# Patient Record
Sex: MALE | Race: WHITE | NOT HISPANIC OR LATINO | Employment: UNEMPLOYED | URBAN - NONMETROPOLITAN AREA
[De-identification: names, ages, dates, MRNs, and addresses within clinical notes are randomized per-mention and may not be internally consistent; named-entity substitution may affect disease eponyms.]

---

## 2020-05-31 ENCOUNTER — HOSPITAL ENCOUNTER (INPATIENT)
Facility: HOSPITAL | Age: 35
LOS: 3 days | Discharge: HOME | End: 2020-06-04
Attending: STUDENT IN AN ORGANIZED HEALTH CARE EDUCATION/TRAINING PROGRAM | Admitting: INTERNAL MEDICINE

## 2020-05-31 ENCOUNTER — APPOINTMENT (OUTPATIENT)
Dept: CT IMAGING | Facility: HOSPITAL | Age: 35
End: 2020-05-31

## 2020-05-31 DIAGNOSIS — R10.11 RIGHT UPPER QUADRANT ABDOMINAL PAIN: ICD-10-CM

## 2020-05-31 DIAGNOSIS — F19.90 IV DRUG USER: ICD-10-CM

## 2020-05-31 DIAGNOSIS — A41.9 SEPSIS, DUE TO UNSPECIFIED ORGANISM, UNSPECIFIED WHETHER ACUTE ORGAN DYSFUNCTION PRESENT (HCC): Primary | ICD-10-CM

## 2020-05-31 LAB
ALBUMIN SERPL-MCNC: 3.9 G/DL (ref 3.5–5.2)
ALBUMIN/GLOB SERPL: 2 G/DL
ALP SERPL-CCNC: 100 U/L (ref 39–117)
ALT SERPL W P-5'-P-CCNC: 208 U/L (ref 1–41)
AMPHET+METHAMPHET UR QL: NEGATIVE
AMPHETAMINES UR QL: NEGATIVE
ANION GAP SERPL CALCULATED.3IONS-SCNC: 16.6 MMOL/L (ref 5–15)
APTT PPP: 45.4 SECONDS (ref 24.5–37.2)
AST SERPL-CCNC: 119 U/L (ref 1–40)
BACTERIA UR QL AUTO: ABNORMAL /HPF
BARBITURATES UR QL SCN: NEGATIVE
BENZODIAZ UR QL SCN: NEGATIVE
BILIRUB SERPL-MCNC: 2.5 MG/DL (ref 0.2–1.2)
BILIRUB UR QL STRIP: ABNORMAL
BUN BLD-MCNC: 36 MG/DL (ref 6–20)
BUN/CREAT SERPL: 16.4 (ref 7–25)
BUPRENORPHINE SERPL-MCNC: NEGATIVE NG/ML
CALCIUM SPEC-SCNC: 8.4 MG/DL (ref 8.6–10.5)
CANNABINOIDS SERPL QL: NEGATIVE
CHLORIDE SERPL-SCNC: 99 MMOL/L (ref 98–107)
CLARITY UR: ABNORMAL
CO2 SERPL-SCNC: 22.4 MMOL/L (ref 22–29)
COCAINE UR QL: NEGATIVE
COD CRY URNS QL: ABNORMAL /HPF
COLOR UR: ABNORMAL
CREAT BLD-MCNC: 2.19 MG/DL (ref 0.76–1.27)
D-LACTATE SERPL-SCNC: 1.8 MMOL/L (ref 0.5–2)
DEPRECATED RDW RBC AUTO: 42.5 FL (ref 37–54)
ERYTHROCYTE [DISTWIDTH] IN BLOOD BY AUTOMATED COUNT: 13.4 % (ref 12.3–15.4)
GFR SERPL CREATININE-BSD FRML MDRD: 34 ML/MIN/1.73
GLOBULIN UR ELPH-MCNC: 2 GM/DL
GLUCOSE BLD-MCNC: 116 MG/DL (ref 65–99)
GLUCOSE UR STRIP-MCNC: NEGATIVE MG/DL
GRAN CASTS URNS QL MICRO: ABNORMAL /LPF
HCT VFR BLD AUTO: 42.6 % (ref 37.5–51)
HGB BLD-MCNC: 14.8 G/DL (ref 13–17.7)
HGB UR QL STRIP.AUTO: NEGATIVE
HYALINE CASTS UR QL AUTO: ABNORMAL /LPF
INR PPP: 1.35 (ref 0.9–1.1)
KETONES UR QL STRIP: NEGATIVE
LEUKOCYTE ESTERASE UR QL STRIP.AUTO: ABNORMAL
LIPASE SERPL-CCNC: 24 U/L (ref 13–60)
LYMPHOCYTES # BLD MANUAL: 0.87 10*3/MM3 (ref 0.7–3.1)
LYMPHOCYTES NFR BLD MANUAL: 1 % (ref 5–12)
LYMPHOCYTES NFR BLD MANUAL: 3 % (ref 19.6–45.3)
MCH RBC QN AUTO: 30.1 PG (ref 26.6–33)
MCHC RBC AUTO-ENTMCNC: 34.7 G/DL (ref 31.5–35.7)
MCV RBC AUTO: 86.6 FL (ref 79–97)
METAMYELOCYTES NFR BLD MANUAL: 2 % (ref 0–0)
METHADONE UR QL SCN: NEGATIVE
MONOCYTES # BLD AUTO: 0.29 10*3/MM3 (ref 0.1–0.9)
NEUTROPHILS # BLD AUTO: 27.21 10*3/MM3 (ref 1.7–7)
NEUTROPHILS NFR BLD MANUAL: 67 % (ref 42.7–76)
NEUTS BAND NFR BLD MANUAL: 27 % (ref 0–5)
NITRITE UR QL STRIP: NEGATIVE
OPIATES UR QL: NEGATIVE
OXYCODONE UR QL SCN: NEGATIVE
PCP UR QL SCN: NEGATIVE
PH UR STRIP.AUTO: <=5 [PH] (ref 5–8)
PLATELET # BLD AUTO: 145 10*3/MM3 (ref 140–450)
PMV BLD AUTO: 10.9 FL (ref 6–12)
POTASSIUM BLD-SCNC: 4.3 MMOL/L (ref 3.5–5.2)
PROCALCITONIN SERPL-MCNC: 72.26 NG/ML (ref 0.1–0.25)
PROPOXYPH UR QL: NEGATIVE
PROT SERPL-MCNC: 5.9 G/DL (ref 6–8.5)
PROT UR QL STRIP: ABNORMAL
PROTHROMBIN TIME: 17.3 SECONDS (ref 12–15.1)
RBC # BLD AUTO: 4.92 10*6/MM3 (ref 4.14–5.8)
RBC # UR: ABNORMAL /HPF
RBC MORPH BLD: NORMAL
REF LAB TEST METHOD: ABNORMAL
SCAN SLIDE: NORMAL
SMALL PLATELETS BLD QL SMEAR: ADEQUATE
SODIUM BLD-SCNC: 138 MMOL/L (ref 136–145)
SP GR UR STRIP: 1.02 (ref 1–1.03)
SQUAMOUS #/AREA URNS HPF: ABNORMAL /HPF
TRICYCLICS UR QL SCN: NEGATIVE
UROBILINOGEN UR QL STRIP: ABNORMAL
WBC MORPH BLD: NORMAL
WBC NRBC COR # BLD: 28.95 10*3/MM3 (ref 3.4–10.8)
WBC UR QL AUTO: ABNORMAL /HPF

## 2020-05-31 PROCEDURE — 25010000002 CEFTRIAXONE SODIUM-DEXTROSE 1-3.74 GM-%(50ML) RECONSTITUTED SOLUTION: Performed by: NURSE PRACTITIONER

## 2020-05-31 PROCEDURE — 84145 PROCALCITONIN (PCT): CPT | Performed by: NURSE PRACTITIONER

## 2020-05-31 PROCEDURE — 80306 DRUG TEST PRSMV INSTRMNT: CPT | Performed by: NURSE PRACTITIONER

## 2020-05-31 PROCEDURE — 81001 URINALYSIS AUTO W/SCOPE: CPT | Performed by: NURSE PRACTITIONER

## 2020-05-31 PROCEDURE — 85007 BL SMEAR W/DIFF WBC COUNT: CPT | Performed by: NURSE PRACTITIONER

## 2020-05-31 PROCEDURE — 85025 COMPLETE CBC W/AUTO DIFF WBC: CPT | Performed by: NURSE PRACTITIONER

## 2020-05-31 PROCEDURE — 25010000002 KETOROLAC TROMETHAMINE PER 15 MG: Performed by: NURSE PRACTITIONER

## 2020-05-31 PROCEDURE — 83605 ASSAY OF LACTIC ACID: CPT | Performed by: NURSE PRACTITIONER

## 2020-05-31 PROCEDURE — 74177 CT ABD & PELVIS W/CONTRAST: CPT

## 2020-05-31 PROCEDURE — 87040 BLOOD CULTURE FOR BACTERIA: CPT | Performed by: NURSE PRACTITIONER

## 2020-05-31 PROCEDURE — 83690 ASSAY OF LIPASE: CPT | Performed by: NURSE PRACTITIONER

## 2020-05-31 PROCEDURE — 99284 EMERGENCY DEPT VISIT MOD MDM: CPT

## 2020-05-31 PROCEDURE — 71250 CT THORAX DX C-: CPT

## 2020-05-31 PROCEDURE — 85730 THROMBOPLASTIN TIME PARTIAL: CPT | Performed by: NURSE PRACTITIONER

## 2020-05-31 PROCEDURE — 25010000002 IOPAMIDOL 61 % SOLUTION: Performed by: STUDENT IN AN ORGANIZED HEALTH CARE EDUCATION/TRAINING PROGRAM

## 2020-05-31 PROCEDURE — 85610 PROTHROMBIN TIME: CPT | Performed by: NURSE PRACTITIONER

## 2020-05-31 PROCEDURE — 25010000002 ONDANSETRON PER 1 MG: Performed by: NURSE PRACTITIONER

## 2020-05-31 PROCEDURE — 80053 COMPREHEN METABOLIC PANEL: CPT | Performed by: NURSE PRACTITIONER

## 2020-05-31 RX ORDER — CEFTRIAXONE 1 G/50ML
1 INJECTION, SOLUTION INTRAVENOUS ONCE
Status: COMPLETED | OUTPATIENT
Start: 2020-05-31 | End: 2020-05-31

## 2020-05-31 RX ORDER — NICOTINE 21 MG/24HR
1 PATCH, TRANSDERMAL 24 HOURS TRANSDERMAL
Status: DISCONTINUED | OUTPATIENT
Start: 2020-05-31 | End: 2020-06-04 | Stop reason: HOSPADM

## 2020-05-31 RX ORDER — SODIUM CHLORIDE 0.9 % (FLUSH) 0.9 %
10 SYRINGE (ML) INJECTION AS NEEDED
Status: DISCONTINUED | OUTPATIENT
Start: 2020-05-31 | End: 2020-06-04 | Stop reason: HOSPADM

## 2020-05-31 RX ORDER — NICOTINE 21 MG/24HR
1 PATCH, TRANSDERMAL 24 HOURS TRANSDERMAL
Status: DISCONTINUED | OUTPATIENT
Start: 2020-06-01 | End: 2020-05-31

## 2020-05-31 RX ORDER — KETOROLAC TROMETHAMINE 30 MG/ML
30 INJECTION, SOLUTION INTRAMUSCULAR; INTRAVENOUS ONCE
Status: COMPLETED | OUTPATIENT
Start: 2020-05-31 | End: 2020-05-31

## 2020-05-31 RX ORDER — ONDANSETRON 2 MG/ML
4 INJECTION INTRAMUSCULAR; INTRAVENOUS ONCE
Status: COMPLETED | OUTPATIENT
Start: 2020-05-31 | End: 2020-05-31

## 2020-05-31 RX ADMIN — ONDANSETRON 4 MG: 2 INJECTION INTRAMUSCULAR; INTRAVENOUS at 21:08

## 2020-05-31 RX ADMIN — KETOROLAC TROMETHAMINE 30 MG: 30 INJECTION, SOLUTION INTRAMUSCULAR at 21:09

## 2020-05-31 RX ADMIN — CEFTRIAXONE 1 G: 1 INJECTION, SOLUTION INTRAVENOUS at 22:27

## 2020-05-31 RX ADMIN — NICOTINE 1 PATCH: 21 PATCH TRANSDERMAL at 23:02

## 2020-05-31 RX ADMIN — SODIUM CHLORIDE 2391 ML: 9 INJECTION, SOLUTION INTRAVENOUS at 21:08

## 2020-05-31 RX ADMIN — SODIUM CHLORIDE 1000 ML: 9 INJECTION, SOLUTION INTRAVENOUS at 23:02

## 2020-05-31 RX ADMIN — IOPAMIDOL 100 ML: 612 INJECTION, SOLUTION INTRAVENOUS at 21:33

## 2020-06-01 ENCOUNTER — APPOINTMENT (OUTPATIENT)
Dept: ULTRASOUND IMAGING | Facility: HOSPITAL | Age: 35
End: 2020-06-01

## 2020-06-01 PROBLEM — B18.2 CHRONIC HEPATITIS C WITHOUT HEPATIC COMA (HCC): Status: ACTIVE | Noted: 2020-06-01

## 2020-06-01 PROBLEM — R10.11 RIGHT UPPER QUADRANT ABDOMINAL PAIN: Status: ACTIVE | Noted: 2020-06-01

## 2020-06-01 PROBLEM — F19.10 IV DRUG ABUSE: Status: ACTIVE | Noted: 2020-06-01

## 2020-06-01 PROBLEM — A41.9 SEPSIS (HCC): Status: ACTIVE | Noted: 2020-06-01

## 2020-06-01 PROBLEM — N17.9 ACUTE RENAL FAILURE (ARF) (HCC): Status: ACTIVE | Noted: 2020-06-01

## 2020-06-01 PROBLEM — F17.210 TOBACCO DEPENDENCE DUE TO CIGARETTES: Status: ACTIVE | Noted: 2020-06-01

## 2020-06-01 LAB
ALBUMIN SERPL-MCNC: 3.1 G/DL (ref 3.5–5.2)
ALBUMIN/GLOB SERPL: 1.9 G/DL
ALP SERPL-CCNC: 95 U/L (ref 39–117)
ALT SERPL W P-5'-P-CCNC: 144 U/L (ref 1–41)
ANION GAP SERPL CALCULATED.3IONS-SCNC: 12.8 MMOL/L (ref 5–15)
AST SERPL-CCNC: 76 U/L (ref 1–40)
BILIRUB SERPL-MCNC: 1.8 MG/DL (ref 0.2–1.2)
BUN BLD-MCNC: 33 MG/DL (ref 6–20)
BUN/CREAT SERPL: 20.9 (ref 7–25)
CALCIUM SPEC-SCNC: 6.9 MG/DL (ref 8.6–10.5)
CHLORIDE SERPL-SCNC: 108 MMOL/L (ref 98–107)
CO2 SERPL-SCNC: 21.2 MMOL/L (ref 22–29)
CREAT BLD-MCNC: 1.58 MG/DL (ref 0.76–1.27)
DEPRECATED RDW RBC AUTO: 44 FL (ref 37–54)
ERYTHROCYTE [DISTWIDTH] IN BLOOD BY AUTOMATED COUNT: 13.6 % (ref 12.3–15.4)
GFR SERPL CREATININE-BSD FRML MDRD: 50 ML/MIN/1.73
GLOBULIN UR ELPH-MCNC: 1.6 GM/DL
GLUCOSE BLD-MCNC: 124 MG/DL (ref 65–99)
HCT VFR BLD AUTO: 35.1 % (ref 37.5–51)
HGB BLD-MCNC: 11.8 G/DL (ref 13–17.7)
LARGE PLATELETS: ABNORMAL
LIPASE SERPL-CCNC: 34 U/L (ref 13–60)
LYMPHOCYTES # BLD MANUAL: 1.08 10*3/MM3 (ref 0.7–3.1)
LYMPHOCYTES NFR BLD MANUAL: 5 % (ref 19.6–45.3)
LYMPHOCYTES NFR BLD MANUAL: 5 % (ref 5–12)
MCH RBC QN AUTO: 29.8 PG (ref 26.6–33)
MCHC RBC AUTO-ENTMCNC: 33.6 G/DL (ref 31.5–35.7)
MCV RBC AUTO: 88.6 FL (ref 79–97)
METAMYELOCYTES NFR BLD MANUAL: 12 % (ref 0–0)
MONOCYTES # BLD AUTO: 1.08 10*3/MM3 (ref 0.1–0.9)
NEUTROPHILS # BLD AUTO: 16.92 10*3/MM3 (ref 1.7–7)
NEUTROPHILS NFR BLD MANUAL: 48 % (ref 42.7–76)
NEUTS BAND NFR BLD MANUAL: 30 % (ref 0–5)
PLATELET # BLD AUTO: 110 10*3/MM3 (ref 140–450)
PMV BLD AUTO: 11.9 FL (ref 6–12)
POTASSIUM BLD-SCNC: 4 MMOL/L (ref 3.5–5.2)
PROT SERPL-MCNC: 4.7 G/DL (ref 6–8.5)
RBC # BLD AUTO: 3.96 10*6/MM3 (ref 4.14–5.8)
RBC MORPH BLD: NORMAL
SCAN SLIDE: NORMAL
SMALL PLATELETS BLD QL SMEAR: ABNORMAL
SODIUM BLD-SCNC: 142 MMOL/L (ref 136–145)
TOXIC GRANULATION: ABNORMAL
WBC NRBC COR # BLD: 21.69 10*3/MM3 (ref 3.4–10.8)

## 2020-06-01 PROCEDURE — 25010000002 VANCOMYCIN 5 G RECONSTITUTED SOLUTION 5,000 MG VIAL: Performed by: STUDENT IN AN ORGANIZED HEALTH CARE EDUCATION/TRAINING PROGRAM

## 2020-06-01 PROCEDURE — 25010000002 PIPERACILLIN SOD-TAZOBACTAM PER 1 G: Performed by: INTERNAL MEDICINE

## 2020-06-01 PROCEDURE — 25010000002 MORPHINE SULFATE (PF) 2 MG/ML SOLUTION: Performed by: INTERNAL MEDICINE

## 2020-06-01 PROCEDURE — 25010000002 MORPHINE PER 10 MG: Performed by: INTERNAL MEDICINE

## 2020-06-01 PROCEDURE — 25010000002 ONDANSETRON PER 1 MG: Performed by: INTERNAL MEDICINE

## 2020-06-01 PROCEDURE — 99223 1ST HOSP IP/OBS HIGH 75: CPT | Performed by: INTERNAL MEDICINE

## 2020-06-01 PROCEDURE — 85025 COMPLETE CBC W/AUTO DIFF WBC: CPT | Performed by: INTERNAL MEDICINE

## 2020-06-01 PROCEDURE — 80053 COMPREHEN METABOLIC PANEL: CPT | Performed by: INTERNAL MEDICINE

## 2020-06-01 PROCEDURE — 85007 BL SMEAR W/DIFF WBC COUNT: CPT | Performed by: INTERNAL MEDICINE

## 2020-06-01 PROCEDURE — 83690 ASSAY OF LIPASE: CPT | Performed by: INTERNAL MEDICINE

## 2020-06-01 PROCEDURE — 76705 ECHO EXAM OF ABDOMEN: CPT

## 2020-06-01 RX ORDER — SODIUM CHLORIDE 0.9 % (FLUSH) 0.9 %
10 SYRINGE (ML) INJECTION AS NEEDED
Status: DISCONTINUED | OUTPATIENT
Start: 2020-06-01 | End: 2020-06-04 | Stop reason: HOSPADM

## 2020-06-01 RX ORDER — BUPROPION HYDROCHLORIDE 150 MG/1
150 TABLET, EXTENDED RELEASE ORAL 2 TIMES DAILY
Status: ON HOLD | COMMUNITY
End: 2020-06-04 | Stop reason: SDUPTHER

## 2020-06-01 RX ORDER — ONDANSETRON 2 MG/ML
4 INJECTION INTRAMUSCULAR; INTRAVENOUS EVERY 6 HOURS PRN
Status: DISCONTINUED | OUTPATIENT
Start: 2020-06-01 | End: 2020-06-04 | Stop reason: HOSPADM

## 2020-06-01 RX ORDER — SODIUM CHLORIDE 9 MG/ML
100 INJECTION, SOLUTION INTRAVENOUS CONTINUOUS
Status: DISCONTINUED | OUTPATIENT
Start: 2020-06-01 | End: 2020-06-03

## 2020-06-01 RX ORDER — ACETAMINOPHEN 325 MG/1
650 TABLET ORAL EVERY 4 HOURS PRN
Status: DISCONTINUED | OUTPATIENT
Start: 2020-06-01 | End: 2020-06-04 | Stop reason: HOSPADM

## 2020-06-01 RX ORDER — MIRTAZAPINE 15 MG/1
15 TABLET, FILM COATED ORAL NIGHTLY PRN
COMMUNITY

## 2020-06-01 RX ORDER — BUPRENORPHINE HYDROCHLORIDE AND NALOXONE HYDROCHLORIDE DIHYDRATE 8; 2 MG/1; MG/1
2 TABLET SUBLINGUAL DAILY
COMMUNITY

## 2020-06-01 RX ORDER — MORPHINE SULFATE 2 MG/ML
2 INJECTION, SOLUTION INTRAMUSCULAR; INTRAVENOUS EVERY 4 HOURS PRN
Status: DISCONTINUED | OUTPATIENT
Start: 2020-06-01 | End: 2020-06-04 | Stop reason: HOSPADM

## 2020-06-01 RX ORDER — MORPHINE SULFATE 4 MG/ML
4 INJECTION, SOLUTION INTRAMUSCULAR; INTRAVENOUS EVERY 4 HOURS PRN
Status: DISCONTINUED | OUTPATIENT
Start: 2020-06-01 | End: 2020-06-04 | Stop reason: HOSPADM

## 2020-06-01 RX ORDER — BUPROPION HYDROCHLORIDE 150 MG/1
150 TABLET, EXTENDED RELEASE ORAL EVERY 12 HOURS SCHEDULED
Status: DISCONTINUED | OUTPATIENT
Start: 2020-06-01 | End: 2020-06-04 | Stop reason: HOSPADM

## 2020-06-01 RX ORDER — SODIUM CHLORIDE 0.9 % (FLUSH) 0.9 %
10 SYRINGE (ML) INJECTION EVERY 12 HOURS SCHEDULED
Status: DISCONTINUED | OUTPATIENT
Start: 2020-06-01 | End: 2020-06-04 | Stop reason: HOSPADM

## 2020-06-01 RX ORDER — ACETAMINOPHEN 650 MG/1
650 SUPPOSITORY RECTAL EVERY 4 HOURS PRN
Status: DISCONTINUED | OUTPATIENT
Start: 2020-06-01 | End: 2020-06-04 | Stop reason: HOSPADM

## 2020-06-01 RX ORDER — ACETAMINOPHEN 160 MG/5ML
650 SOLUTION ORAL EVERY 4 HOURS PRN
Status: DISCONTINUED | OUTPATIENT
Start: 2020-06-01 | End: 2020-06-04 | Stop reason: HOSPADM

## 2020-06-01 RX ORDER — NALOXONE HCL 0.4 MG/ML
0.4 VIAL (ML) INJECTION
Status: DISCONTINUED | OUTPATIENT
Start: 2020-06-01 | End: 2020-06-04 | Stop reason: HOSPADM

## 2020-06-01 RX ORDER — L.ACID,PARA/B.BIFIDUM/S.THERM 8B CELL
1 CAPSULE ORAL 2 TIMES DAILY
Status: DISCONTINUED | OUTPATIENT
Start: 2020-06-01 | End: 2020-06-04 | Stop reason: HOSPADM

## 2020-06-01 RX ADMIN — SODIUM CHLORIDE 100 ML/HR: 9 INJECTION, SOLUTION INTRAVENOUS at 16:23

## 2020-06-01 RX ADMIN — ONDANSETRON 4 MG: 2 INJECTION INTRAMUSCULAR; INTRAVENOUS at 21:56

## 2020-06-01 RX ADMIN — SODIUM CHLORIDE, PRESERVATIVE FREE 10 ML: 5 INJECTION INTRAVENOUS at 09:48

## 2020-06-01 RX ADMIN — ACETAMINOPHEN 650 MG: 325 TABLET, FILM COATED ORAL at 20:39

## 2020-06-01 RX ADMIN — SODIUM CHLORIDE 1000 ML: 9 INJECTION, SOLUTION INTRAVENOUS at 00:26

## 2020-06-01 RX ADMIN — TAZOBACTAM SODIUM AND PIPERACILLIN SODIUM 3.38 G: 375; 3 INJECTION, SOLUTION INTRAVENOUS at 03:25

## 2020-06-01 RX ADMIN — SODIUM CHLORIDE 100 ML/HR: 9 INJECTION, SOLUTION INTRAVENOUS at 03:24

## 2020-06-01 RX ADMIN — TAZOBACTAM SODIUM AND PIPERACILLIN SODIUM 3.38 G: 375; 3 INJECTION, SOLUTION INTRAVENOUS at 16:23

## 2020-06-01 RX ADMIN — ONDANSETRON 4 MG: 2 INJECTION INTRAMUSCULAR; INTRAVENOUS at 10:04

## 2020-06-01 RX ADMIN — TAZOBACTAM SODIUM AND PIPERACILLIN SODIUM 3.38 G: 375; 3 INJECTION, SOLUTION INTRAVENOUS at 10:08

## 2020-06-01 RX ADMIN — SODIUM CHLORIDE, PRESERVATIVE FREE 10 ML: 5 INJECTION INTRAVENOUS at 20:40

## 2020-06-01 RX ADMIN — Medication 1 CAPSULE: at 09:48

## 2020-06-01 RX ADMIN — MORPHINE SULFATE 2 MG: 2 INJECTION, SOLUTION INTRAMUSCULAR; INTRAVENOUS at 13:07

## 2020-06-01 RX ADMIN — ACETAMINOPHEN 650 MG: 325 TABLET, FILM COATED ORAL at 02:34

## 2020-06-01 RX ADMIN — SODIUM CHLORIDE 1000 ML: 9 INJECTION, SOLUTION INTRAVENOUS at 01:41

## 2020-06-01 RX ADMIN — Medication 1 CAPSULE: at 20:39

## 2020-06-01 RX ADMIN — ACETAMINOPHEN 650 MG: 325 TABLET, FILM COATED ORAL at 10:04

## 2020-06-01 RX ADMIN — VANCOMYCIN HYDROCHLORIDE 1500 MG: 500 INJECTION, POWDER, LYOPHILIZED, FOR SOLUTION INTRAVENOUS at 01:41

## 2020-06-01 RX ADMIN — MORPHINE SULFATE 2 MG: 2 INJECTION, SOLUTION INTRAMUSCULAR; INTRAVENOUS at 17:17

## 2020-06-01 RX ADMIN — BUPROPION HYDROCHLORIDE 150 MG: 150 TABLET, EXTENDED RELEASE ORAL at 20:39

## 2020-06-01 RX ADMIN — MORPHINE SULFATE 4 MG: 4 INJECTION, SOLUTION INTRAMUSCULAR; INTRAVENOUS at 21:53

## 2020-06-01 NOTE — PAYOR COMM NOTE
"TO:P[facility]SPORT  FROM:CARLOS STEELE RN PHONE [phone]  INPT NOTIFICATION/CLINICALS    Triston Villanueva (34 y.o. Male)     Date of Birth Social Security Number Address Home Phone MRN    1985  [address] [facility] [address] [phone] [id number]    Mu-ism Marital Status          None Single       Admission Date Admission Type Admitting Provider Attending Provider Department, Room/Bed    20 Emergency Geovany Gerber MD Shields, Morgan Blanton, DO [facility] MED SURG  4, 407/1    Discharge Date Discharge Disposition Discharge Destination                       Attending Provider:  Tomi Bolaños DO    Allergies:  No Known Allergies    Isolation:  None   Infection:  None   Code Status:  CPR    Ht:  177.8 cm (70\")   Wt:  83.6 kg (184 lb 4.9 oz)    Admission Cmt:  None   Principal Problem:  Sepsis (CMS/HCC) [A41.9]                 Active Insurance as of 2020     Primary Coverage     Payor Plan Insurance Group Employer/Plan Group    [employer] [id number]     Payor Plan Address Payor Plan Phone Number Payor Plan Fax Number Effective Dates    [address] [phone]  10/1/2015 - None Entered    [facility] [id number]       Subscriber Name Subscriber Birth Date Member ID       TRISTON VILLANUEVA 1985 [id number]                 Emergency Contacts      (Rel.) Home Phone Work Phone Mobile Phone    JAZMIN VILLANUEVA (Sister) [phone] -- --    KIYA VILLANUEVA (Mother) [phone] -- --               History & Physical      Geovany Gerber MD at 20 0041              [facility] HOSPITALIST   HISTORY AND PHYSICAL      Name:  Triston Villanueva   Age:  34 y.o.  Sex:  male  :  1985  MRN:  [id number]   Visit Number:  [id number]  Admission Date:  2020  Date Of Service:  20  Primary Care Physician:  Provider, No Known    Chief Complaint:     Abdominal pain.    History Of Presenting Illness:      This is a " "34-year-old male with history of IV drug abuse, hepatitis C was brought to the emergency room by his sister with complaints of abdominal pain, nausea and vomiting.  Patient states that he he used drugs 4 days ago for about 2 days and then since started feeling generalized weakness associated with abdominal pain, nausea and vomiting.  Patient states that he has a longstanding history of IV heroin use but apparently was clean for about a month before relapsing.  He denies using any other drugs recently.  He started having right upper quadrant abdominal pain associated with nausea and vomiting and he felt that he may have \"messed up with his liver\".  He started having fever earlier today and subsequently came to the emergency room.    In the emergency room, he was afebrile at 97.6 but tachycardic at 114.  Initial blood pressure was 93/67 which improved with IV hydration.  Pulse oxygen saturation was 98% on room air.  Blood work done in the emergency room revealed a creatinine of 16.6 and a BUN of 36.  ALT was 208 and AST was 119.  Lactic acid was 1.8 but his procalcitonin was elevated at 72.  INR was 1.35.  His WBC was 28.95 with 27% bands.  CT of the chest was unremarkable.  CT of the abdomen however showed gallbladder wall thickening with pericholecystic fluid but no evidence of gallstone or biliary ductal dilation.  Patient was ordered 3 L of normal saline boluses and was started on IV antibiotic therapy with Rocephin and is currently being admitted to the medical floor.    Patient is currently lying down on the bed and is comfortable at rest.  He does have history of hepatitis C but has never been treated for that.  He works in construction.  He smokes 1-1/2 packets of cigarettes per day.    Review Of Systems:     General ROS: Subjective fevers. Complains of generalized weakness.  Psychological ROS: No history of any hallucinations and delusions.  Ophthalmic ROS: No history of any diplopia or transient loss of " vision.  ENT ROS: No history of sore throat, nasal congestion or ear pain.   Allergy and Immunology ROS: No history of rash or itching.  Hematological and Lymphatic ROS: No history of neck swelling or easy bleeding.  Endocrine ROS: No history of any recent unintentional weight gain or loss.  Respiratory ROS: No history of cough or shortness of breath.  Cardiovascular ROS: No history of chest pain or palpitations.  Gastrointestinal ROS: As per history of presenting illness.  Genito-Urinary ROS: No history of dysuria or hematuria.  Musculoskeletal ROS: No muscle pain. No calf pain.   Neurological ROS: No history of any focal weakness. No loss of consciousness. Denies any numbness.  Dermatological ROS: No history of any redness or pruritis.     Past Medical History:    Past Medical History:   Diagnosis Date   • Hepatitis C      Past Surgical history:    History reviewed. No pertinent surgical history.    Social History:    Social History     Socioeconomic History   • Marital status: Single     Spouse name: Not on file   • Number of children: Not on file   • Years of education: Not on file   • Highest education level: Not on file   Tobacco Use   • Smoking status: Current Every Day Smoker     Packs/day: 0.50     Types: Cigarettes   Substance and Sexual Activity   • Alcohol use: Never     Frequency: Never   • Drug use: Yes     Types: IV     Comment: relapsed     Family History:    History reviewed. No pertinent family history.    Allergies:      Patient has no known allergies.    Home Medications:    Prior to Admission Medications     None          ED Medications:    Medications   sodium chloride 0.9 % flush 10 mL (has no administration in time range)   nicotine (NICODERM CQ) 21 MG/24HR patch 1 patch (1 patch Transdermal Medication Applied 5/31/20 7789)   sodium chloride 0.9 % bolus 1,000 mL (1,000 mL Intravenous New Bag 6/1/20 0026)   sodium chloride 0.9 % bolus 2,391 mL (0 mL Intravenous Stopped 6/1/20 0025)   ketorolac  (TORADOL) injection 30 mg (30 mg Intravenous Given 5/31/20 2109)   ondansetron (ZOFRAN) injection 4 mg (4 mg Intravenous Given 5/31/20 2108)   iopamidol (ISOVUE-300) 61 % injection 100 mL (100 mL Intravenous Given 5/31/20 2133)   sodium chloride 0.9 % bolus 1,000 mL (0 mL Intravenous Stopped 6/1/20 0024)   cefTRIAXone (ROCEPHIN) IVPB 1 g/50ml dextrose (premix) (0 g Intravenous Stopped 5/31/20 2251)     Vital Signs:    Temp:  [97.6 °F (36.4 °C)-99.2 °F (37.3 °C)] 99.2 °F (37.3 °C)  Heart Rate:  [108-118] 108  Resp:  [14-16] 16  BP: (85-95)/(51-67) 90/51        05/31/20 2031   Weight: 79.7 kg (175 lb 12.8 oz)     Body mass index is 25.22 kg/m².    Physical Exam:    General Appearance:  Alert and cooperative, not in any acute distress.   Head:  Atraumatic and normocephalic, without obvious abnormality.   Eyes:          PERRLA, conjunctivae and sclerae normal, no Icterus. No pallor. Extraocular movements are within normal limits.   Ears:  Ears appear intact with no abnormalities noted.   Throat: No oral lesions, no thrush, oral mucosa moist.   Neck: Supple, trachea midline, no thyromegaly, no carotid bruit.   Back:   No kyphoscoliosis present. No tenderness to palpation,   range of motion normal.   Lungs:   Chest shape is normal. Breath sounds heard bilaterally equally.  No crackles or wheezing. No Pleural rub or bronchial breathing.   Heart:  Normal S1 and S2, no murmur, no gallop, no rub. No JVD.   Abdomen:   Normal bowel sounds, no masses, no organomegaly. Soft, tenderness noted in the epigastric and right upper quadrant areas, nondistended, no guarding, no rebound tenderness.   Extremities: Moves all extremities well, no edema, no cyanosis, no clubbing.   Pulses: Pulses palpable and equal bilaterally.   Skin: No bleeding, bruising or rash.   Neurologic: Alert and oriented x 3. Moves all four limbs equally. No tremors. No facial asymmetry.     Laboratory data:    I have reviewed the labs done in the emergency  room.    Results from last 7 days   Lab Units 05/31/20 2107   SODIUM mmol/L 138   POTASSIUM mmol/L 4.3   CHLORIDE mmol/L 99   CO2 mmol/L 22.4   BUN mg/dL 36*   CREATININE mg/dL 2.19*   CALCIUM mg/dL 8.4*   BILIRUBIN mg/dL 2.5*   ALK PHOS U/L 100   ALT (SGPT) U/L 208*   AST (SGOT) U/L 119*   GLUCOSE mg/dL 116*     Results from last 7 days   Lab Units 05/31/20 2107   WBC 10*3/mm3 28.95*   HEMOGLOBIN g/dL 14.8   HEMATOCRIT % 42.6   PLATELETS 10*3/mm3 145     Results from last 7 days   Lab Units 05/31/20 2107   INR  1.35*       Results from last 7 days   Lab Units 05/31/20 2107   LIPASE U/L 24         Results from last 7 days   Lab Units 05/31/20 2103   COLOR UA  Dark Yellow*   GLUCOSE UA  Negative   KETONES UA  Negative   LEUKOCYTES UA  Trace*   PH, URINE  <=5.0   BILIRUBIN UA  Moderate (2+)*   UROBILINOGEN UA  1.0 E.U./dL     Pain Management Panel     Pain Management Panel Latest Ref Rng & Units 5/31/2020    AMPHETAMINES SCREEN, URINE Negative Negative    BARBITURATES SCREEN Negative Negative    BENZODIAZEPINE SCREEN, URINE Negative Negative    BUPRENORPHINEUR Negative Negative    COCAINE SCREEN, URINE Negative Negative    METHADONE SCREEN, URINE Negative Negative    METHAMPHETAMINEUR Negative Negative        Radiology:    Imaging Results (Last 72 Hours)     Procedure Component Value Units Date/Time    CT Chest Without Contrast [[id number]] Collected:  05/31/20 2244     Updated:  05/31/20 2245    Narrative:       FINAL REPORT    TECHNIQUE:  Axial images were obtained from the lung apex to the mid abdomen  by computed tomography. [Coronal reformatted images were  obtained.]  This study was performed with techniques to keep  radiation doses as low as reasonably achievable, (ALARA).  Individualized dose reduction techniques using automated  exposure control or adjustment of mA and/or kV according to the  patient''s size were employed.    CLINICAL HISTORY:  quest septic emboli    FINDINGS:  There is no axillary  adenopathy. There is no hilar or  mediastinal adenopathy. Heart size is normal. There is no  pericardial or pleural effusion. Limited images of the upper  abdomen are unremarkable. No suspicious infiltrate or nodule is  identified.      Impression:       No acute process.    Authenticated by Darien Espinal III, MD on 05/31/2020  10:44:22 PM    CT Abdomen Pelvis With Contrast [[id number]] Collected:  05/31/20 2202     Updated:  05/31/20 2203    Narrative:       FINAL REPORT    CLINICAL HISTORY:  right side abdominal pain, nausea and vomiting    FINDINGS:  CT OF THE ABDOMEN AND PELVIS WITH CONTRAST  Axial CT images of  the abdomen and pelvis were obtained after the administration of  intravenous contrast. Coronal reformatted images were also  obtained and reviewed.This study was performed with techniques  to keep radiation doses as low as reasonably achievable (ALARA).  Individualized dose reduction techniques using automated  exposure control or adjustment of mA and/or kV according to the  patient''s size were employed.  Abdomen: The lung bases are  clear. The heart is normal in size.  The liver is fatty  infiltrated.  There is a probable small cyst in the right  hepatic lobe.  There is gallbladder wall thickening with  pericholecystic fluid.  There is no CT evidence of gallstones or  biliary ductal dilatation.  The spleen is unremarkable. No  adrenal mass is present. The pancreas has an unremarkable  appearance. The kidneys are normal, without evidence of mass or  hydronephrosis. The aorta is normal in caliber.    Pelvis: The  appendix is not well-visualized. The urinary bladder is  unremarkable. No inflammatory process is seen. There is no  evidence of mass or adenopathy. There is no evidence of bowel  obstruction.      Impression:       Gallbladder wall thickening with pericholecystic fluid but no  evidence of gallstones or biliary ductal dilatation.  If  indicated, right upper quadrant  ultrasound.    Authenticated by Darien Espinal III, MD on 05/31/2020  10:02:01 PM        Assessment:    1.  Sepsis likely secondary to #2, present on admission.  2.  Suspected acute cholecystitis, present on admission.  3.  Acute renal failure, present on admission.  4.  Chronic hepatitis C.  5.  IV drug abuse.  6.  Chronic tobacco dependence.    Plan:    Mr. Villanueva is currently being admitted to the medical floor with telemetry as an inpatient.  He has already received around 3 L of IV fluids and will be continued on normal saline at 100 mL/h.  We will treat him with IV antibiotic therapy with Zosyn for suspected cholecystitis.  At this time, I do not suspect infective endocarditis but we will continue to follow up the blood cultures drawn in the emergency room.  We will order right upper quadrant ultrasound and if he does have evidence of acute cholecystitis, we will consult general surgery.    Patient does have acute renal failure likely secondary to dehydration.  Baseline creatinine is currently unavailable but he may have underlying chronic kidney disease due to his hepatitis C.  We will maintain him on IV fluids and repeat his renal function in the morning.    I have strongly advised the patient to discontinue IV drug use and discontinue smoking.  He states that he has 2 small kids and one on the way.  He has been to short-term rehabilitation in the past but has never tried in-house long-term rehabilitation.  We will consult  for information regarding this.  His CODE STATUS will be full code.  He will be placed on nicotine patch.  Further recommendations depend upon his clinical course.    Advance Care Planning      ACP discussion was held with the patient during this visit. Patient does not have an advance directive, declines further assistance.      Geovany Gerber MD  06/01/20  00:41    Dictated utilizing Dragon dictation.    Electronically signed by Geovany Gerber MD at 06/01/20 0122         "  Emergency Department Notes      Geraldine Palmer, APRN at 05/31/20 2058     Attestation signed by Shay Saavedra MD at 06/01/20 0128          For this patient encounter, I met and examined the patient as well as had a repeat visit after IV fluids to see how he was doing.  On my exam at that time the patient was mildly tachycardic in the 100-110 range.  Lung sounds were clear to auscultation. I reviewed the NP or PA documentation, treatment plan, and medical decision making. Shay Saavedra MD 6/1/2020 01:26                  Subjective   History of Present Illness  This is a 34-year-old gentleman who comes in today complaining of nausea vomiting and abdominal pain.  He reports a history of hepatitis C and prior drug use.  He states 4 days ago he relapsed and used for 2 days and has not used in the past 2 days and has been feeling like he is extremely dehydrated and \"I think I done something to my liver\".  He denies any fever chills cough or congestion.  Review of Systems   Constitutional: Negative.    HENT: Negative.    Eyes: Negative.    Respiratory: Negative.    Cardiovascular: Negative.    Gastrointestinal: Positive for abdominal pain, nausea and vomiting.   Endocrine: Negative.    Genitourinary: Negative.    Musculoskeletal: Negative.    Skin: Negative.    Allergic/Immunologic: Negative.    Neurological: Negative.    Hematological: Negative.    Psychiatric/Behavioral: Negative.        Past Medical History:   Diagnosis Date   • Hepatitis C        No Known Allergies    History reviewed. No pertinent surgical history.    History reviewed. No pertinent family history.    Social History     Socioeconomic History   • Marital status: Single     Spouse name: Not on file   • Number of children: Not on file   • Years of education: Not on file   • Highest education level: Not on file   Tobacco Use   • Smoking status: Current Every Day Smoker     Packs/day: 0.50     Types: Cigarettes   Substance and Sexual Activity "   • Alcohol use: Never     Frequency: Never   • Drug use: Yes     Types: IV     Comment: relapsed           Objective   Physical Exam   Constitutional: He appears well-developed and well-nourished.   Nursing note and vitals reviewed.  GEN: No acute distress  Head: Normocephalic, atraumatic  Eyes: Pupils equal round reactive to light  ENT: Posterior pharynx normal in appearance, oral mucosa is moist  Chest: Nontender to palpation  Cardiovascular: Regular rate  Lungs: Clear to auscultation bilaterally  Abdomen: Soft, tender right upper and lower quadrant, nondistended, no peritoneal signs  Extremities: No edema, normal appearance  Neuro: GCS 15  Psych: Mood and affect are appropriate      Procedures          ED Course  ED Course as of Jun 01 0043   Sun May 31, 2020   2237 Discussed the patient's case and the results that I have so far with him.  He is requesting to go outside and smoke I told him that he was cannot go outside and smoke we would give him a nicotine patch.    [TW]   Mon Jun 01, 2020   0041 Dr. Rodriguez accepted for admission  Pt. Feeling much better after fluids.     [TW]      ED Course User Index  [TW] Geraldine Palmer APRN                                           MDM  Number of Diagnoses or Management Options     Amount and/or Complexity of Data Reviewed  Clinical lab tests: ordered and reviewed  Tests in the radiology section of CPT®:  ordered and reviewed  Review and summarize past medical records: yes  Discuss the patient with other providers: yes    Risk of Complications, Morbidity, and/or Mortality  Presenting problems: moderate  Diagnostic procedures: moderate  Management options: moderate        Final diagnoses:   Sepsis, due to unspecified organism, unspecified whether acute organ dysfunction present (CMS/[facility])   IV drug user            Geraldine Palmer APRN  06/01/20 0043      Electronically signed by Shay Saavedra MD at 06/01/20 0128     Drake Pepper, RN at 05/31/20 2236         Patient stated he did not want the 3rd bag of fluid because his sister needed to come get him.  Geraldine notified.     Drake Pepper RN  05/31/20 2236      Electronically signed by Drake Pepper RN at 05/31/20 2236     Karen Paniagua RN at 05/31/20 2327        Report from Drake/Karen Orellana RN, RN  05/31/20 2327      Electronically signed by Karen Paniagua RN at 05/31/20 2327         Current Facility-Administered Medications   Medication Dose Route Frequency Provider Last Rate Last Dose   • acetaminophen (TYLENOL) tablet 650 mg  650 mg Oral Q4H PRN Geovany Gerber MD   650 mg at 06/01/20 1004    Or   • acetaminophen (TYLENOL) 160 MG/5ML solution 650 mg  650 mg Oral Q4H PRN Geovany Gerber MD        Or   • acetaminophen (TYLENOL) suppository 650 mg  650 mg Rectal Q4H PRN Geovany Gerber MD       • lactobacillus acidophilus (RISAQUAD) capsule 1 capsule  1 capsule Oral BID Geovany Gerber MD   1 capsule at 06/01/20 0948   • Morphine sulfate (PF) injection 4 mg  4 mg Intravenous Q4H PRN Geovany Gerber MD        And   • naloxone (NARCAN) injection 0.4 mg  0.4 mg Intravenous Q5 Min PRN Geovany Gerber MD       • nicotine (NICODERM CQ) 21 MG/24HR patch 1 patch  1 patch Transdermal Q24H Geovany Gerber MD   1 patch at 05/31/20 2302   • ondansetron (ZOFRAN) injection 4 mg  4 mg Intravenous Q6H PRN Geovany Gerber MD   4 mg at 06/01/20 1004   • Pharmacy to Dose Zosyn   Does not apply Continuous PRN Geovany Gerber MD       • piperacillin-tazobactam (ZOSYN) 3.375 g in iso-osmotic dextrose 50 ml (premix)  3.375 g Intravenous Q8H Geovany Gerber MD   3.375 g at 06/01/20 1008   • sodium chloride 0.9 % flush 10 mL  10 mL Intravenous PRN Geovany Gerber MD       • sodium chloride 0.9 % flush 10 mL  10 mL Intravenous Q12H Geovany Gerber MD   10 mL at 06/01/20 0948   • sodium chloride 0.9 % flush 10 mL  10 mL Intravenous PRN Geovany Gerber MD       • sodium chloride 0.9 % infusion  100 mL/hr Intravenous Continuous Zabrina,  MD Geovany 100 mL/hr at 06/01/20 0324 100 mL/hr at 06/01/20 0324       Lab Results (last 24 hours)     Procedure Component Value Units Date/Time    CBC Auto Differential [[id number]]  (Abnormal) Collected:  06/01/20 0523    Specimen:  Blood Updated:  06/01/20 0632     WBC 21.69 10*3/mm3      RBC 3.96 10*6/mm3      Hemoglobin 11.8 g/dL      Hematocrit 35.1 %      MCV 88.6 fL      MCH 29.8 pg      MCHC 33.6 g/dL      RDW 13.6 %      RDW-SD 44.0 fl      MPV 11.9 fL      Platelets 110 10*3/mm3     Scan Slide [[id number]] Collected:  06/01/20 0523    Specimen:  Blood Updated:  06/01/20 0632     Scan Slide --     Comment: See Manual Differential Results       Manual Differential [[id number]]  (Abnormal) Collected:  06/01/20 0523    Specimen:  Blood Updated:  06/01/20 0632     Neutrophil % 48.0 %      Lymphocyte % 5.0 %      Monocyte % 5.0 %      Bands %  30.0 %      Metamyelocyte % 12.0 %      Neutrophils Absolute 16.92 10*3/mm3      Lymphocytes Absolute 1.08 10*3/mm3      Monocytes Absolute 1.08 10*3/mm3      RBC Morphology Normal     Toxic Granulation Slight/1+     Platelet Estimate Decreased     Large Platelets Slight/1+    Comprehensive Metabolic Panel [[id number]]  (Abnormal) Collected:  06/01/20 0524    Specimen:  Blood Updated:  06/01/20 0624     Glucose 124 mg/dL      BUN 33 mg/dL      Creatinine 1.58 mg/dL      Sodium 142 mmol/L      Potassium 4.0 mmol/L      Chloride 108 mmol/L      CO2 21.2 mmol/L      Calcium 6.9 mg/dL      Total Protein 4.7 g/dL      Albumin 3.10 g/dL      ALT (SGPT) 144 U/L      AST (SGOT) 76 U/L      Alkaline Phosphatase 95 U/L      Total Bilirubin 1.8 mg/dL      eGFR Non African Amer 50 mL/min/1.73      Globulin 1.6 gm/dL      A/G Ratio 1.9 g/dL      BUN/Creatinine Ratio 20.9     Anion Gap 12.8 mmol/L     Narrative:       GFR Normal >60  Chronic Kidney Disease <60  Kidney Failure <15      Lipase [[id number]]  (Normal) Collected:  06/01/20 0524    Specimen:  Blood Updated:  06/01/20 0624      Lipase 34 U/L     Blood Culture - Blood, Arm, Left [[id number]] Collected:  05/31/20 2218    Specimen:  Blood from Arm, Left Updated:  05/31/20 2218    Blood Culture - Blood, Arm, Left [[id number]] Collected:  05/31/20 2206    Specimen:  Blood from Arm, Left Updated:  05/31/20 2217    CBC & Differential [[id number]] Collected:  05/31/20 2107    Specimen:  Blood Updated:  05/31/20 2142    Narrative:       The following orders were created for panel order CBC & Differential.  Procedure                               Abnormality         Status                     ---------                               -----------         ------                     CBC Auto Differential[[id number]]        Abnormal            Final result                 Please view results for these tests on the individual orders.    CBC Auto Differential [[id number]]  (Abnormal) Collected:  05/31/20 2107    Specimen:  Blood Updated:  05/31/20 2142     WBC 28.95 10*3/mm3      RBC 4.92 10*6/mm3      Hemoglobin 14.8 g/dL      Hematocrit 42.6 %      MCV 86.6 fL      MCH 30.1 pg      MCHC 34.7 g/dL      RDW 13.4 %      RDW-SD 42.5 fl      MPV 10.9 fL      Platelets 145 10*3/mm3     Scan Slide [[id number]] Collected:  05/31/20 2107    Specimen:  Blood Updated:  05/31/20 2142     Scan Slide --     Comment: See Manual Differential Results       Manual Differential [[id number]]  (Abnormal) Collected:  05/31/20 2107    Specimen:  Blood Updated:  05/31/20 2142     Neutrophil % 67.0 %      Lymphocyte % 3.0 %      Monocyte % 1.0 %      Bands %  27.0 %      Metamyelocyte % 2.0 %      Neutrophils Absolute 27.21 10*3/mm3      Lymphocytes Absolute 0.87 10*3/mm3      Monocytes Absolute 0.29 10*3/mm3      RBC Morphology Normal     WBC Morphology Normal     Platelet Estimate Adequate    Procalcitonin [[id number]]  (Abnormal) Collected:  05/31/20 2107    Specimen:  Blood Updated:  05/31/20 2142     Procalcitonin 72.26 ng/mL     Narrative:       As a Marker for Sepsis  "(Non-Neonates):   1. <0.5 ng/mL represents a low risk of severe sepsis and/or septic shock.  1. >2 ng/mL represents a high risk of severe sepsis and/or septic shock.    As a Marker for Lower Respiratory Tract Infections that require antibiotic therapy:  PCT on Admission     Antibiotic Therapy             6-12 Hrs later  > 0.5                Strongly Recommended            >0.25 - <0.5         Recommended  0.1 - 0.25           Discouraged                   Remeasure/reassess PCT  <0.1                 Strongly Discouraged          Remeasure/reassess PCT      As 28 day mortality risk marker: \"Change in Procalcitonin Result\" (> 80 % or <=80 %) if Day 0 (or Day 1) and Day 4 values are available. Refer to [email or web address]-[email or web address]/   Change in PCT <=80 %   A decrease of PCT levels below or equal to 80 % defines a positive change in PCT test result representing a higher risk for 28-day all-cause mortality of patients diagnosed with severe sepsis or septic shock.  Change in PCT > 80 %   A decrease of PCT levels of more than 80 % defines a negative change in PCT result representing a lower risk for 28-day all-cause mortality of patients diagnosed with severe sepsis or septic shock.                Results may be falsely decreased if patient taking Biotin.     Urine Drug Screen - Urine, Clean Catch [[id number]]  (Normal) Collected:  05/31/20 2103    Specimen:  Urine, Clean Catch Updated:  05/31/20 2141     THC, Screen, Urine Negative     Phencyclidine (PCP), Urine Negative     Cocaine Screen, Urine Negative     Methamphetamine, Ur Negative     Opiate Screen Negative     Amphetamine Screen, Urine Negative     Benzodiazepine Screen, Urine Negative     Tricyclic Antidepressants Screen Negative     Methadone Screen, Urine Negative     Barbiturates Screen, Urine Negative     Oxycodone Screen, Urine Negative     Propoxyphene Screen Negative     Buprenorphine, Screen, Urine Negative    Narrative:       Limitations of this " procedure include the possibility of false positives due to interfering substances in the urine sample. Clinical data should be correlated with any questionable result. Positive results should be considered Presumptive Positive until results are confirmed with another methodology such as HPLC or GCMS.    Urinalysis With Microscopic If Indicated (No Culture) - Urine, Clean Catch [[id number]]  (Abnormal) Collected:  05/31/20 2103    Specimen:  Urine, Clean Catch Updated:  05/31/20 2136     Color, UA Dark Yellow     Appearance, UA Cloudy     pH, UA <=5.0     Specific Gravity, UA 1.023     Glucose, UA Negative     Ketones, UA Negative     Bilirubin, UA Moderate (2+)     Blood, UA Negative     Protein, UA 30 mg/dL (1+)     Leuk Esterase, UA Trace     Nitrite, UA Negative     Urobilinogen, UA 1.0 E.U./dL    Urinalysis, Microscopic Only - Urine, Clean Catch [[id number]]  (Abnormal) Collected:  05/31/20 2103    Specimen:  Urine, Clean Catch Updated:  05/31/20 2136     RBC, UA 0-2 /HPF      WBC, UA 6-12 /HPF      Bacteria, UA 2+ /HPF      Squamous Epithelial Cells, UA 3-6 /HPF      Hyaline Casts, UA 3-6 /LPF      Granular Casts, UA 3-6 /LPF      Calcium Oxalate Crystals, UA Small/1+ /HPF      Methodology Manual Light Microscopy    aPTT [[id number]]  (Abnormal) Collected:  05/31/20 2107    Specimen:  Blood Updated:  05/31/20 2136     PTT 45.4 seconds     Protime-INR [[id number]]  (Abnormal) Collected:  05/31/20 2107    Specimen:  Blood Updated:  05/31/20 2136     Protime 17.3 Seconds      INR 1.35    Narrative:       Suggested INR therapeutic range for stable oral anticoagulant therapy:    Low Intensity therapy:   1.5-2.0  Moderate Intensity therapy:   2.0-3.0  High Intensity therapy:   2.5-4.0    Comprehensive Metabolic Panel [[id number]]  (Abnormal) Collected:  05/31/20 2107    Specimen:  Blood Updated:  05/31/20 2135     Glucose 116 mg/dL      BUN 36 mg/dL      Creatinine 2.19 mg/dL      Sodium 138 mmol/L      Potassium 4.3  mmol/L      Chloride 99 mmol/L      CO2 22.4 mmol/L      Calcium 8.4 mg/dL      Total Protein 5.9 g/dL      Albumin 3.90 g/dL      ALT (SGPT) 208 U/L      AST (SGOT) 119 U/L      Alkaline Phosphatase 100 U/L      Total Bilirubin 2.5 mg/dL      eGFR Non African Amer 34 mL/min/1.73      Globulin 2.0 gm/dL      A/G Ratio 2.0 g/dL      BUN/Creatinine Ratio 16.4     Anion Gap 16.6 mmol/L     Narrative:       GFR Normal >60  Chronic Kidney Disease <60  Kidney Failure <15      Lipase [[id number]]  (Normal) Collected:  05/31/20 2107    Specimen:  Blood Updated:  05/31/20 2135     Lipase 24 U/L     Lactic Acid, Plasma [[id number]]  (Normal) Collected:  05/31/20 2107    Specimen:  Blood Updated:  05/31/20 2133     Lactate 1.8 mmol/L         Physician Progress Notes (last 24 hours) (Notes from 05/31/20 1029 through 06/01/20 1029)    No notes of this type exist for this encounter.         Consult Notes (last 24 hours) (Notes from 05/31/20 1029 through 06/01/20 1029)    No notes of this type exist for this encounter.

## 2020-06-01 NOTE — PHARMACY RECOMMENDATION
"Pharmacy to dose piperacillin-tazobactam    Triston Villanueva is a 34 y.o. male  177.8 cm (70\") 83.6 kg (184 lb 4.9 oz)    Indication for use: intra-abdominal infection/suspected cholecystitis    Results from last 7 days   Lab Units 05/31/20  2107   WBC 10*3/mm3 28.95*   CREATININE mg/dL 2.19*      Estimated Creatinine Clearance: 55.1 mL/min (A) (by C-G formula based on SCr of 2.19 mg/dL (H)).    Temp Readings from Last 1 Encounters:   06/01/20 99.1 °F (37.3 °C) (Oral)       Culture results  Microbiology Results (last 10 days)       ** No results found for the last 240 hours. **            Other Antimicrobials  Vancomycin 1500 mg IVPB once  Ceftriaxone 1 g IVPB once    Assessment/Plan  Initiated Piperacillin-tazobactam 3.375 g IVPB every 8 hours, extended infusion.  Pharmacy will monitor renal function and adjust dose accordingly.    Thank you,  Alayna Shell [facility]  06/01/20 05:01        "

## 2020-06-01 NOTE — PROGRESS NOTES
Discharge Planning Assessment  [facility]     Patient Name: Triston Villanueva  MRN: [id number]  Today's Date: 6/1/2020    Admit Date: 5/31/2020    Discharge Needs Assessment     Row Name 06/01/20 1412       Living Environment    Lives With  parent(s)    Name(s) of Who Lives With Patient  His mother Claudett    Primary Care Provided by  self    Provides Primary Care For  no one    Caregiving Concerns  Has substance abuse problem    Family Caregiver if Needed  none    Quality of Family Relationships  unable to assess    Able to Return to Prior Arrangements  yes    Living Arrangement Comments  Lives with his mother in a 2 bedroom apartment.  Has no medical equipment.  Plans to go home at discharge.  Has transportation.       Resource/Environmental Concerns    Transportation Concerns  car, none       Discharge Needs Assessment    Anticipated Changes Related to Illness  none    Equipment Needed After Discharge  none    Provided Post Acute Provider List?  N/A    N/A Provider List Comment  Has no HH, DME or Skilled Nursing needs.         Discharge Plan     Row Name 06/01/20 1417       Plan    Plan Spoke to pt in room.  Has no POA or Living Will.  Address and phone no correct.  Lives with his mother in a 2 bedroom apartment.  Has no medical equipment.  Plans to go home at Discharge and family will transport.  Consulf to  for Drug rehab.                  Expected Discharge Date and Time     Expected Discharge Date Expected Discharge Time    Jun 2, 2020         Demographic Summary     Row Name 06/01/20 1402       General Information    Referral Source  admission list    Reason for Consult  discharge planning     Used During This Interaction  no    Row Name 06/01/20 1331       General Information    Admission Type  inpatient    Arrived From  emergency department    Expected Length of Stay (LOS)  2 tp 3 day         Functional Status     Row Name 06/01/20 1402       Functional Status    Usual Activity Tolerance  good     Current Activity Tolerance  moderate    Functional Status Comments  Independent       Functional Status, IADL    Medications  independent    Meal Preparation  independent    Housekeeping  independent    Laundry  independent    Shopping  independent    IADL Comments  Self       Mental Status    General Appearance WDL  WDL       Mental Status Summary    Recent Changes in Mental Status/Cognitive Functioning  no changes    Mental Status Comments  Alert and oriented                Alcira Hernandez RN

## 2020-06-01 NOTE — H&P
"    [facility]      Name:  Triston Villanueva   Age:  34 y.o.  Sex:  male  :  1985  MRN:  [id number]   Visit Number:  [id number]  Admission Date:  2020  Date Of Service:  20  Primary Care Physician:  Provider, No Known    Chief Complaint:     Abdominal pain.    History Of Presenting Illness:      This is a 34-year-old male with history of IV drug abuse, hepatitis C was brought to the emergency room by his sister with complaints of abdominal pain, nausea and vomiting.  Patient states that he he used drugs 4 days ago for about 2 days and then since started feeling generalized weakness associated with abdominal pain, nausea and vomiting.  Patient states that he has a longstanding history of IV heroin use but apparently was clean for about a month before relapsing.  He denies using any other drugs recently.  He started having right upper quadrant abdominal pain associated with nausea and vomiting and he felt that he may have \"messed up with his liver\".  He started having fever earlier today and subsequently came to the emergency room.    In the emergency room, he was afebrile at 97.6 but tachycardic at 114.  Initial blood pressure was 93/67 which improved with IV hydration.  Pulse oxygen saturation was 98% on room air.  Blood work done in the emergency room revealed a creatinine of 16.6 and a BUN of 36.  ALT was 208 and AST was 119.  Lactic acid was 1.8 but his procalcitonin was elevated at 72.  INR was 1.35.  His WBC was 28.95 with 27% bands.  CT of the chest was unremarkable.  CT of the abdomen however showed gallbladder wall thickening with pericholecystic fluid but no evidence of gallstone or biliary ductal dilation.  Patient was ordered 3 L of normal saline boluses and was started on IV antibiotic therapy with Rocephin and is currently being admitted to the medical floor.    Patient is currently lying down on the bed and is comfortable at rest.  He " does have history of hepatitis C but has never been treated for that.  He works in construction.  He smokes 1-1/2 packets of cigarettes per day.    Review Of Systems:     General ROS: Subjective fevers. Complains of generalized weakness.  Psychological ROS: No history of any hallucinations and delusions.  Ophthalmic ROS: No history of any diplopia or transient loss of vision.  ENT ROS: No history of sore throat, nasal congestion or ear pain.   Allergy and Immunology ROS: No history of rash or itching.  Hematological and Lymphatic ROS: No history of neck swelling or easy bleeding.  Endocrine ROS: No history of any recent unintentional weight gain or loss.  Respiratory ROS: No history of cough or shortness of breath.  Cardiovascular ROS: No history of chest pain or palpitations.  Gastrointestinal ROS: As per history of presenting illness.  Genito-Urinary ROS: No history of dysuria or hematuria.  Musculoskeletal ROS: No muscle pain. No calf pain.   Neurological ROS: No history of any focal weakness. No loss of consciousness. Denies any numbness.  Dermatological ROS: No history of any redness or pruritis.     Past Medical History:    Past Medical History:   Diagnosis Date   • Hepatitis C      Past Surgical history:    History reviewed. No pertinent surgical history.    Social History:    Social History     Socioeconomic History   • Marital status: Single     Spouse name: Not on file   • Number of children: Not on file   • Years of education: Not on file   • Highest education level: Not on file   Tobacco Use   • Smoking status: Current Every Day Smoker     Packs/day: 0.50     Types: Cigarettes   Substance and Sexual Activity   • Alcohol use: Never     Frequency: Never   • Drug use: Yes     Types: IV     Comment: relapsed     Family History:    History reviewed. No pertinent family history.    Allergies:      Patient has no known allergies.    Home Medications:    Prior to Admission Medications     None          ED  Medications:    Medications   sodium chloride 0.9 % flush 10 mL (has no administration in time range)   nicotine (NICODERM CQ) 21 MG/24HR patch 1 patch (1 patch Transdermal Medication Applied 5/31/20 2302)   sodium chloride 0.9 % bolus 1,000 mL (1,000 mL Intravenous New Bag 6/1/20 0026)   sodium chloride 0.9 % bolus 2,391 mL (0 mL Intravenous Stopped 6/1/20 0025)   ketorolac (TORADOL) injection 30 mg (30 mg Intravenous Given 5/31/20 2109)   ondansetron (ZOFRAN) injection 4 mg (4 mg Intravenous Given 5/31/20 2108)   iopamidol (ISOVUE-300) 61 % injection 100 mL (100 mL Intravenous Given 5/31/20 2133)   sodium chloride 0.9 % bolus 1,000 mL (0 mL Intravenous Stopped 6/1/20 0024)   cefTRIAXone (ROCEPHIN) IVPB 1 g/50ml dextrose (premix) (0 g Intravenous Stopped 5/31/20 2251)     Vital Signs:    Temp:  [97.6 °F (36.4 °C)-99.2 °F (37.3 °C)] 99.2 °F (37.3 °C)  Heart Rate:  [108-118] 108  Resp:  [14-16] 16  BP: (85-95)/(51-67) 90/51        05/31/20 2031   Weight: 79.7 kg (175 lb 12.8 oz)     Body mass index is 25.22 kg/m².    Physical Exam:    General Appearance:  Alert and cooperative, not in any acute distress.   Head:  Atraumatic and normocephalic, without obvious abnormality.   Eyes:          PERRLA, conjunctivae and sclerae normal, no Icterus. No pallor. Extraocular movements are within normal limits.   Ears:  Ears appear intact with no abnormalities noted.   Throat: No oral lesions, no thrush, oral mucosa moist.   Neck: Supple, trachea midline, no thyromegaly, no carotid bruit.   Back:   No kyphoscoliosis present. No tenderness to palpation,   range of motion normal.   Lungs:   Chest shape is normal. Breath sounds heard bilaterally equally.  No crackles or wheezing. No Pleural rub or bronchial breathing.   Heart:  Normal S1 and S2, no murmur, no gallop, no rub. No JVD.   Abdomen:   Normal bowel sounds, no masses, no organomegaly. Soft, tenderness noted in the epigastric and right upper quadrant areas, nondistended, no  guarding, no rebound tenderness.   Extremities: Moves all extremities well, no edema, no cyanosis, no clubbing.   Pulses: Pulses palpable and equal bilaterally.   Skin: No bleeding, bruising or rash.   Neurologic: Alert and oriented x 3. Moves all four limbs equally. No tremors. No facial asymmetry.     Laboratory data:    I have reviewed the labs done in the emergency room.    Results from last 7 days   Lab Units 05/31/20 2107   SODIUM mmol/L 138   POTASSIUM mmol/L 4.3   CHLORIDE mmol/L 99   CO2 mmol/L 22.4   BUN mg/dL 36*   CREATININE mg/dL 2.19*   CALCIUM mg/dL 8.4*   BILIRUBIN mg/dL 2.5*   ALK PHOS U/L 100   ALT (SGPT) U/L 208*   AST (SGOT) U/L 119*   GLUCOSE mg/dL 116*     Results from last 7 days   Lab Units 05/31/20 2107   WBC 10*3/mm3 28.95*   HEMOGLOBIN g/dL 14.8   HEMATOCRIT % 42.6   PLATELETS 10*3/mm3 145     Results from last 7 days   Lab Units 05/31/20 2107   INR  1.35*       Results from last 7 days   Lab Units 05/31/20  2107   LIPASE U/L 24         Results from last 7 days   Lab Units 05/31/20 2103   COLOR UA  Dark Yellow*   GLUCOSE UA  Negative   KETONES UA  Negative   LEUKOCYTES UA  Trace*   PH, URINE  <=5.0   BILIRUBIN UA  Moderate (2+)*   UROBILINOGEN UA  1.0 E.U./dL     Pain Management Panel     Pain Management Panel Latest Ref Rng & Units 5/31/2020    AMPHETAMINES SCREEN, URINE Negative Negative    BARBITURATES SCREEN Negative Negative    BENZODIAZEPINE SCREEN, URINE Negative Negative    BUPRENORPHINEUR Negative Negative    COCAINE SCREEN, URINE Negative Negative    METHADONE SCREEN, URINE Negative Negative    METHAMPHETAMINEUR Negative Negative        Radiology:    Imaging Results (Last 72 Hours)     Procedure Component Value Units Date/Time    CT Chest Without Contrast [[id number]] Collected:  05/31/20 2244     Updated:  05/31/20 2245    Narrative:       FINAL REPORT    TECHNIQUE:  Axial images were obtained from the lung apex to the mid abdomen  by computed tomography. [Coronal reformatted  images were  obtained.]  This study was performed with techniques to keep  radiation doses as low as reasonably achievable, (ALARA).  Individualized dose reduction techniques using automated  exposure control or adjustment of mA and/or kV according to the  patient''s size were employed.    CLINICAL HISTORY:  quest septic emboli    FINDINGS:  There is no axillary adenopathy. There is no hilar or  mediastinal adenopathy. Heart size is normal. There is no  pericardial or pleural effusion. Limited images of the upper  abdomen are unremarkable. No suspicious infiltrate or nodule is  identified.      Impression:       No acute process.    Authenticated by Darien Espinal III, MD on 05/31/2020  10:44:22 PM    CT Abdomen Pelvis With Contrast [[id number]] Collected:  05/31/20 2202     Updated:  05/31/20 2203    Narrative:       FINAL REPORT    CLINICAL HISTORY:  right side abdominal pain, nausea and vomiting    FINDINGS:  CT OF THE ABDOMEN AND PELVIS WITH CONTRAST  Axial CT images of  the abdomen and pelvis were obtained after the administration of  intravenous contrast. Coronal reformatted images were also  obtained and reviewed.This study was performed with techniques  to keep radiation doses as low as reasonably achievable (ALARA).  Individualized dose reduction techniques using automated  exposure control or adjustment of mA and/or kV according to the  patient''s size were employed.  Abdomen: The lung bases are  clear. The heart is normal in size.  The liver is fatty  infiltrated.  There is a probable small cyst in the right  hepatic lobe.  There is gallbladder wall thickening with  pericholecystic fluid.  There is no CT evidence of gallstones or  biliary ductal dilatation.  The spleen is unremarkable. No  adrenal mass is present. The pancreas has an unremarkable  appearance. The kidneys are normal, without evidence of mass or  hydronephrosis. The aorta is normal in caliber.    Pelvis: The  appendix is not  well-visualized. The urinary bladder is  unremarkable. No inflammatory process is seen. There is no  evidence of mass or adenopathy. There is no evidence of bowel  obstruction.      Impression:       Gallbladder wall thickening with pericholecystic fluid but no  evidence of gallstones or biliary ductal dilatation.  If  indicated, right upper quadrant ultrasound.    Authenticated by Darien Espinal III, MD on 05/31/2020  10:02:01 PM        Assessment:    1.  Sepsis likely secondary to #2, present on admission.  2.  Suspected acute cholecystitis, present on admission.  3.  Acute renal failure, present on admission.  4.  Chronic hepatitis C.  5.  IV drug abuse.  6.  Chronic tobacco dependence.    Plan:    Mr. Villanueva is currently being admitted to the medical floor with telemetry as an inpatient.  He has already received around 3 L of IV fluids and will be continued on normal saline at 100 mL/h.  We will treat him with IV antibiotic therapy with Zosyn for suspected cholecystitis.  At this time, I do not suspect infective endocarditis but we will continue to follow up the blood cultures drawn in the emergency room.  We will order right upper quadrant ultrasound and if he does have evidence of acute cholecystitis, we will consult general surgery.    Patient does have acute renal failure likely secondary to dehydration.  Baseline creatinine is currently unavailable but he may have underlying chronic kidney disease due to his hepatitis C.  We will maintain him on IV fluids and repeat his renal function in the morning.    I have strongly advised the patient to discontinue IV drug use and discontinue smoking.  He states that he has 2 small kids and one on the way.  He has been to short-term rehabilitation in the past but has never tried in-house long-term rehabilitation.  We will consult  for information regarding this.  His CODE STATUS will be full code.  He will be placed on nicotine patch.  Further  recommendations depend upon his clinical course.    Advance Care Planning      ACP discussion was held with the patient during this visit. Patient does not have an advance directive, declines further assistance.      Geovany Gerber MD  06/01/20  00:41    Dictated utilizing Dragon dictation.

## 2020-06-01 NOTE — ED NOTES
Patient stated he did not want the 3rd bag of fluid because his sister needed to come get him.  Geraldine notified.     Drake Pepper RN  05/31/20 8726

## 2020-06-01 NOTE — ED PROVIDER NOTES
"Subjective   History of Present Illness  This is a 34-year-old gentleman who comes in today complaining of nausea vomiting and abdominal pain.  He reports a history of hepatitis C and prior drug use.  He states 4 days ago he relapsed and used for 2 days and has not used in the past 2 days and has been feeling like he is extremely dehydrated and \"I think I done something to my liver\".  He denies any fever chills cough or congestion.  Review of Systems   Constitutional: Negative.    HENT: Negative.    Eyes: Negative.    Respiratory: Negative.    Cardiovascular: Negative.    Gastrointestinal: Positive for abdominal pain, nausea and vomiting.   Endocrine: Negative.    Genitourinary: Negative.    Musculoskeletal: Negative.    Skin: Negative.    Allergic/Immunologic: Negative.    Neurological: Negative.    Hematological: Negative.    Psychiatric/Behavioral: Negative.        Past Medical History:   Diagnosis Date   • Hepatitis C        No Known Allergies    History reviewed. No pertinent surgical history.    History reviewed. No pertinent family history.    Social History     Socioeconomic History   • Marital status: Single     Spouse name: Not on file   • Number of children: Not on file   • Years of education: Not on file   • Highest education level: Not on file   Tobacco Use   • Smoking status: Current Every Day Smoker     Packs/day: 0.50     Types: Cigarettes   Substance and Sexual Activity   • Alcohol use: Never     Frequency: Never   • Drug use: Yes     Types: IV     Comment: relapsed           Objective   Physical Exam   Constitutional: He appears well-developed and well-nourished.   Nursing note and vitals reviewed.  GEN: No acute distress  Head: Normocephalic, atraumatic  Eyes: Pupils equal round reactive to light  ENT: Posterior pharynx normal in appearance, oral mucosa is moist  Chest: Nontender to palpation  Cardiovascular: Regular rate  Lungs: Clear to auscultation bilaterally  Abdomen: Soft, tender right upper " and lower quadrant, nondistended, no peritoneal signs  Extremities: No edema, normal appearance  Neuro: GCS 15  Psych: Mood and affect are appropriate      Procedures           ED Course  ED Course as of Jun 01 0043   Sun May 31, 2020   2237 Discussed the patient's case and the results that I have so far with him.  He is requesting to go outside and smoke I told him that he was cannot go outside and smoke we would give him a nicotine patch.    [TW]   Mon Jun 01, 2020 0041 Dr. Rodriguez accepted for admission  Pt. Feeling much better after fluids.     [TW]      ED Course User Index  [TW] Geraldine Palmer, APRN                                           MDM  Number of Diagnoses or Management Options     Amount and/or Complexity of Data Reviewed  Clinical lab tests: ordered and reviewed  Tests in the radiology section of CPT®: ordered and reviewed  Review and summarize past medical records: yes  Discuss the patient with other providers: yes    Risk of Complications, Morbidity, and/or Mortality  Presenting problems: moderate  Diagnostic procedures: moderate  Management options: moderate        Final diagnoses:   Sepsis, due to unspecified organism, unspecified whether acute organ dysfunction present (CMS/[facility])   IV drug user            Geraldine Palmer, JUSTICE  06/01/20 0043

## 2020-06-01 NOTE — PROGRESS NOTES
"      [facility]    Name:  Triston Villanueva   Age:  34 y.o.  Sex:  male  :  1985  MRN:  [id number]   Visit Number:  [id number]  Admission Date:  2020  Date Of Service:  20  Primary Care Physician:  Deepika, No Known     LOS: 0 days :  Patient Care Team:  Provider, No Known as PCP - General:    Chief Complaint:      Abdominal pain    Subjective / Interval History:     Patient seen and evaluated this morning.  Sister at bedside.  Stated that he was having generalized allover body aches.  Does complain of pain in his right upper quadrant as well.  Is nauseous and unable to tolerate food this morning.  Otherwise, denies any chest pain or shortness of breath.    This is a 34-year-old male with history of IV drug abuse, hepatitis C was brought to the emergency room by his sister with complaints of abdominal pain, nausea and vomiting.  Patient states that he he used drugs 4 days ago for about 2 days and then since started feeling generalized weakness associated with abdominal pain, nausea and vomiting.  Patient states that he has a longstanding history of IV heroin use but apparently was clean for about a month before relapsing.  He denies using any other drugs recently.  He started having right upper quadrant abdominal pain associated with nausea and vomiting and he felt that he may have \"messed up with his liver\".  He started having fever earlier today and subsequently came to the emergency room.     In the emergency room, he was afebrile at 97.6 but tachycardic at 114.  Initial blood pressure was 93/67 which improved with IV hydration.  Pulse oxygen saturation was 98% on room air.  Blood work done in the emergency room revealed a creatinine of 16.6 and a BUN of 36.  ALT was 208 and AST was 119.  Lactic acid was 1.8 but his procalcitonin was elevated at 72.  INR was 1.35.  His WBC was 28.95 with 27% bands.  CT of the chest was unremarkable.  CT of the abdomen " however showed gallbladder wall thickening with pericholecystic fluid but no evidence of gallstone or biliary ductal dilation.  Patient was ordered 3 L of normal saline boluses and was started on IV antibiotic therapy with Rocephin and is currently being admitted to the medical floor.    Review of Systems:     General ROS: Patient denies any fevers, chills or loss of consciousness.  Respiratory ROS: Denies cough or shortness of breath.  Cardiovascular ROS: Denies chest pain or palpitations. No history of exertional chest pain.  Gastrointestinal ROS: Complains of abdominal pain with nausea, no vomiting.  No diarrhea.  Neurological ROS: Denies any focal weakness. No loss of consciousness. Denies any numbness.  Dermatological ROS: Denies any redness or pruritis.    Vital Signs:    Temp:  [97.6 °F (36.4 °C)-99.2 °F (37.3 °C)] 98.8 °F (37.1 °C)  Heart Rate:  [103-118] 103  Resp:  [14-18] 18  BP: (80-95)/(40-67) 94/49    Intake and output:    I/O last 3 completed shifts:  In: 5405 [P.O.:240; I.V.:1724; IV Piggyback:3441]  Out: 650 [Urine:650]  I/O this shift:  In: 240 [P.O.:240]  Out: 550 [Urine:550]    Physical Examination:    General Appearance:  Alert and cooperative, not in any acute distress.   Head:  Atraumatic and normocephalic, without obvious abnormality.   Eyes:          PERRLA, conjunctivae and sclerae normal, no Icterus. No pallor. Extraocular movements are within normal limits.   Neck: Supple, trachea midline, no thyromegaly, no carotid bruit.   Lungs:   Chest shape is normal. Breath sounds heard bilaterally equally.  No crackles or wheezing. No Pleural rub or bronchial breathing.   Heart:  Normal S1 and S2, no murmur, no gallop, no rub. No JVD   Abdomen:   Normal bowel sounds, soft, nondistended, does have pain on palpation in right upper quadrant.   Extremities: Moves all extremities well, no edema, no cyanosis, no clubbing.   Skin: No bleeding, bruising or rash.   Neurologic: Awake, alert and oriented  times 3. Moves all 4 extremities equally.     Laboratory results:    Results from last 7 days   Lab Units 06/01/20  0524 05/31/20 2107   SODIUM mmol/L 142 138   POTASSIUM mmol/L 4.0 4.3   CHLORIDE mmol/L 108* 99   CO2 mmol/L 21.2* 22.4   BUN mg/dL 33* 36*   CREATININE mg/dL 1.58* 2.19*   CALCIUM mg/dL 6.9* 8.4*   BILIRUBIN mg/dL 1.8* 2.5*   ALK PHOS U/L 95 100   ALT (SGPT) U/L 144* 208*   AST (SGOT) U/L 76* 119*   GLUCOSE mg/dL 124* 116*     Results from last 7 days   Lab Units 06/01/20 0523 05/31/20 2107   WBC 10*3/mm3 21.69* 28.95*   HEMOGLOBIN g/dL 11.8* 14.8   HEMATOCRIT % 35.1* 42.6   PLATELETS 10*3/mm3 110* 145     Results from last 7 days   Lab Units 05/31/20 2107   INR  1.35*               I have reviewed the patient's laboratory results.    Radiology results:    Imaging Results (Last 24 Hours)     Procedure Component Value Units Date/Time    CT Chest Without Contrast [[id number]] Collected:  05/31/20 2244     Updated:  05/31/20 2245    Narrative:       FINAL REPORT    TECHNIQUE:  Axial images were obtained from the lung apex to the mid abdomen  by computed tomography. [Coronal reformatted images were  obtained.]  This study was performed with techniques to keep  radiation doses as low as reasonably achievable, (ALARA).  Individualized dose reduction techniques using automated  exposure control or adjustment of mA and/or kV according to the  patient''s size were employed.    CLINICAL HISTORY:  quest septic emboli    FINDINGS:  There is no axillary adenopathy. There is no hilar or  mediastinal adenopathy. Heart size is normal. There is no  pericardial or pleural effusion. Limited images of the upper  abdomen are unremarkable. No suspicious infiltrate or nodule is  identified.      Impression:       No acute process.    Authenticated by Darien Espinal III, MD on 05/31/2020  10:44:22 PM    CT Abdomen Pelvis With Contrast [[id number]] Collected:  05/31/20 2202     Updated:  05/31/20 2203    Narrative:        FINAL REPORT    CLINICAL HISTORY:  right side abdominal pain, nausea and vomiting    FINDINGS:  CT OF THE ABDOMEN AND PELVIS WITH CONTRAST  Axial CT images of  the abdomen and pelvis were obtained after the administration of  intravenous contrast. Coronal reformatted images were also  obtained and reviewed.This study was performed with techniques  to keep radiation doses as low as reasonably achievable (ALARA).  Individualized dose reduction techniques using automated  exposure control or adjustment of mA and/or kV according to the  patient''s size were employed.  Abdomen: The lung bases are  clear. The heart is normal in size.  The liver is fatty  infiltrated.  There is a probable small cyst in the right  hepatic lobe.  There is gallbladder wall thickening with  pericholecystic fluid.  There is no CT evidence of gallstones or  biliary ductal dilatation.  The spleen is unremarkable. No  adrenal mass is present. The pancreas has an unremarkable  appearance. The kidneys are normal, without evidence of mass or  hydronephrosis. The aorta is normal in caliber.    Pelvis: The  appendix is not well-visualized. The urinary bladder is  unremarkable. No inflammatory process is seen. There is no  evidence of mass or adenopathy. There is no evidence of bowel  obstruction.      Impression:       Gallbladder wall thickening with pericholecystic fluid but no  evidence of gallstones or biliary ductal dilatation.  If  indicated, right upper quadrant ultrasound.    Authenticated by Darien Espinal III, MD on 05/31/2020  10:02:01 PM          I have reviewed the patient's radiology reports.    Medication Review:     I have reviewed the patients active and prn medications.       Sepsis (CMS/HCC)    Acute renal failure (ARF) (CMS/HCC)    Right upper quadrant abdominal pain    Chronic hepatitis C without hepatic coma (CMS/HCC)    Tobacco dependence due to cigarettes    IV drug abuse (CMS/HCC)      Assessment:    1.  Sepsis likely  secondary to #2, present on admission.  2.  Suspected acute cholecystitis, present on admission.  3.  Acute renal failure, present on admission.  4.  Chronic hepatitis C.  5.  IV drug abuse.  6.  Chronic tobacco dependence.    Plan:    Continue to monitor patient in the hospital today.  We will continue to follow up on blood cultures, no growth to date.  Right upper quadrant ultrasound pending.  Continue with Zosyn.  No suspicion for infective endocarditis at this time.  We will continue to monitor closely.  Suspect that CIELO secondary to dehydration.  Creatinine is improving with IV volume resuscitation.  Will continue.  Encouraged patient to discontinue IV drug use and discontinue smoking.  Have spoken with patient about the importance of following up to undergo treatment for hepatitis C.  Have added morphine as needed for pain control.  Concern for withdrawal as patient is a longtime heroin IV user.  Further orders as clinical course dictates.  Anticipate greater than 2 midnight stay.    Tomi Bolaños,   06/01/20  12:26    Dictated utilizing Dragon dictation.

## 2020-06-01 NOTE — PLAN OF CARE
Problem: Patient Care Overview  Goal: Plan of Care Review  Outcome: Ongoing (interventions implemented as appropriate)  Flowsheets (Taken 6/1/2020 0350)  Progress: no change  Plan of Care Reviewed With: patient  Outcome Summary: NEW ADMISSION,  BP LOW, PT RECEIVED 4 LITERS BOLUS IN ED, IVF'S, ANTIBIOTICS AS ORDERED,  DENIES ANY PAIN, DIZZINESS OR SOA.

## 2020-06-02 ENCOUNTER — APPOINTMENT (OUTPATIENT)
Dept: MRI IMAGING | Facility: HOSPITAL | Age: 35
End: 2020-06-02

## 2020-06-02 LAB
ALBUMIN SERPL-MCNC: 3.2 G/DL (ref 3.5–5.2)
ALBUMIN/GLOB SERPL: 1.6 G/DL
ALP SERPL-CCNC: 103 U/L (ref 39–117)
ALT SERPL W P-5'-P-CCNC: 115 U/L (ref 1–41)
ANION GAP SERPL CALCULATED.3IONS-SCNC: 11.7 MMOL/L (ref 5–15)
AST SERPL-CCNC: 53 U/L (ref 1–40)
BASOPHILS # BLD AUTO: 0.03 10*3/MM3 (ref 0–0.2)
BASOPHILS NFR BLD AUTO: 0.2 % (ref 0–1.5)
BILIRUB SERPL-MCNC: 1.4 MG/DL (ref 0.2–1.2)
BUN BLD-MCNC: 13 MG/DL (ref 6–20)
BUN/CREAT SERPL: 12.9 (ref 7–25)
CALCIUM SPEC-SCNC: 8.1 MG/DL (ref 8.6–10.5)
CHLORIDE SERPL-SCNC: 110 MMOL/L (ref 98–107)
CO2 SERPL-SCNC: 22.3 MMOL/L (ref 22–29)
CREAT BLD-MCNC: 1.01 MG/DL (ref 0.76–1.27)
DEPRECATED RDW RBC AUTO: 46.4 FL (ref 37–54)
EOSINOPHIL # BLD AUTO: 0.05 10*3/MM3 (ref 0–0.4)
EOSINOPHIL NFR BLD AUTO: 0.4 % (ref 0.3–6.2)
ERYTHROCYTE [DISTWIDTH] IN BLOOD BY AUTOMATED COUNT: 14.1 % (ref 12.3–15.4)
GFR SERPL CREATININE-BSD FRML MDRD: 84 ML/MIN/1.73
GLOBULIN UR ELPH-MCNC: 2 GM/DL
GLUCOSE BLD-MCNC: 108 MG/DL (ref 65–99)
HCT VFR BLD AUTO: 36.8 % (ref 37.5–51)
HGB BLD-MCNC: 12.3 G/DL (ref 13–17.7)
IMM GRANULOCYTES # BLD AUTO: 0.17 10*3/MM3 (ref 0–0.05)
IMM GRANULOCYTES NFR BLD AUTO: 1.2 % (ref 0–0.5)
LYMPHOCYTES # BLD AUTO: 1.34 10*3/MM3 (ref 0.7–3.1)
LYMPHOCYTES NFR BLD AUTO: 9.7 % (ref 19.6–45.3)
MCH RBC QN AUTO: 30.1 PG (ref 26.6–33)
MCHC RBC AUTO-ENTMCNC: 33.4 G/DL (ref 31.5–35.7)
MCV RBC AUTO: 90.2 FL (ref 79–97)
MONOCYTES # BLD AUTO: 0.67 10*3/MM3 (ref 0.1–0.9)
MONOCYTES NFR BLD AUTO: 4.8 % (ref 5–12)
NEUTROPHILS # BLD AUTO: 11.62 10*3/MM3 (ref 1.7–7)
NEUTROPHILS NFR BLD AUTO: 83.7 % (ref 42.7–76)
NRBC BLD AUTO-RTO: 0 /100 WBC (ref 0–0.2)
PLATELET # BLD AUTO: 116 10*3/MM3 (ref 140–450)
PMV BLD AUTO: 12.1 FL (ref 6–12)
POTASSIUM BLD-SCNC: 4 MMOL/L (ref 3.5–5.2)
PROT SERPL-MCNC: 5.2 G/DL (ref 6–8.5)
RBC # BLD AUTO: 4.08 10*6/MM3 (ref 4.14–5.8)
RBC MORPH BLD: NORMAL
SMALL PLATELETS BLD QL SMEAR: NORMAL
SODIUM BLD-SCNC: 144 MMOL/L (ref 136–145)
WBC MORPH BLD: NORMAL
WBC NRBC COR # BLD: 13.88 10*3/MM3 (ref 3.4–10.8)

## 2020-06-02 PROCEDURE — 99232 SBSQ HOSP IP/OBS MODERATE 35: CPT | Performed by: INTERNAL MEDICINE

## 2020-06-02 PROCEDURE — 85007 BL SMEAR W/DIFF WBC COUNT: CPT | Performed by: INTERNAL MEDICINE

## 2020-06-02 PROCEDURE — 25010000002 PIPERACILLIN SOD-TAZOBACTAM PER 1 G: Performed by: INTERNAL MEDICINE

## 2020-06-02 PROCEDURE — 25010000002 ONDANSETRON PER 1 MG: Performed by: INTERNAL MEDICINE

## 2020-06-02 PROCEDURE — 80053 COMPREHEN METABOLIC PANEL: CPT | Performed by: INTERNAL MEDICINE

## 2020-06-02 PROCEDURE — 80074 ACUTE HEPATITIS PANEL: CPT | Performed by: NURSE PRACTITIONER

## 2020-06-02 PROCEDURE — 85025 COMPLETE CBC W/AUTO DIFF WBC: CPT | Performed by: INTERNAL MEDICINE

## 2020-06-02 PROCEDURE — 74181 MRI ABDOMEN W/O CONTRAST: CPT

## 2020-06-02 RX ORDER — CHOLECALCIFEROL (VITAMIN D3) 125 MCG
5 CAPSULE ORAL NIGHTLY PRN
Status: DISCONTINUED | OUTPATIENT
Start: 2020-06-02 | End: 2020-06-04 | Stop reason: HOSPADM

## 2020-06-02 RX ORDER — MIRTAZAPINE 15 MG/1
15 TABLET, FILM COATED ORAL NIGHTLY PRN
Status: DISCONTINUED | OUTPATIENT
Start: 2020-06-02 | End: 2020-06-04 | Stop reason: HOSPADM

## 2020-06-02 RX ORDER — OXYCODONE HYDROCHLORIDE AND ACETAMINOPHEN 5; 325 MG/1; MG/1
1 TABLET ORAL EVERY 4 HOURS PRN
Status: DISCONTINUED | OUTPATIENT
Start: 2020-06-02 | End: 2020-06-04 | Stop reason: HOSPADM

## 2020-06-02 RX ADMIN — OXYCODONE HYDROCHLORIDE AND ACETAMINOPHEN 1 TABLET: 5; 325 TABLET ORAL at 01:53

## 2020-06-02 RX ADMIN — OXYCODONE HYDROCHLORIDE AND ACETAMINOPHEN 1 TABLET: 5; 325 TABLET ORAL at 06:12

## 2020-06-02 RX ADMIN — MIRTAZAPINE 15 MG: 15 TABLET, FILM COATED ORAL at 23:24

## 2020-06-02 RX ADMIN — OXYCODONE HYDROCHLORIDE AND ACETAMINOPHEN 1 TABLET: 5; 325 TABLET ORAL at 10:16

## 2020-06-02 RX ADMIN — SODIUM CHLORIDE 100 ML/HR: 9 INJECTION, SOLUTION INTRAVENOUS at 16:55

## 2020-06-02 RX ADMIN — OXYCODONE HYDROCHLORIDE AND ACETAMINOPHEN 1 TABLET: 5; 325 TABLET ORAL at 14:04

## 2020-06-02 RX ADMIN — ONDANSETRON 4 MG: 2 INJECTION INTRAMUSCULAR; INTRAVENOUS at 06:35

## 2020-06-02 RX ADMIN — Medication 1 CAPSULE: at 08:38

## 2020-06-02 RX ADMIN — MELATONIN TAB 5 MG 5 MG: 5 TAB at 23:24

## 2020-06-02 RX ADMIN — OXYCODONE HYDROCHLORIDE AND ACETAMINOPHEN 1 TABLET: 5; 325 TABLET ORAL at 18:23

## 2020-06-02 RX ADMIN — Medication 1 CAPSULE: at 20:05

## 2020-06-02 RX ADMIN — NICOTINE 1 PATCH: 21 PATCH TRANSDERMAL at 08:38

## 2020-06-02 RX ADMIN — TAZOBACTAM SODIUM AND PIPERACILLIN SODIUM 3.38 G: 375; 3 INJECTION, SOLUTION INTRAVENOUS at 01:39

## 2020-06-02 RX ADMIN — TAZOBACTAM SODIUM AND PIPERACILLIN SODIUM 3.38 G: 375; 3 INJECTION, SOLUTION INTRAVENOUS at 08:39

## 2020-06-02 RX ADMIN — SODIUM CHLORIDE 100 ML/HR: 9 INJECTION, SOLUTION INTRAVENOUS at 04:17

## 2020-06-02 RX ADMIN — OXYCODONE HYDROCHLORIDE AND ACETAMINOPHEN 1 TABLET: 5; 325 TABLET ORAL at 22:12

## 2020-06-02 RX ADMIN — SODIUM CHLORIDE, PRESERVATIVE FREE 10 ML: 5 INJECTION INTRAVENOUS at 20:08

## 2020-06-02 RX ADMIN — BUPROPION HYDROCHLORIDE 150 MG: 150 TABLET, EXTENDED RELEASE ORAL at 08:38

## 2020-06-02 RX ADMIN — TAZOBACTAM SODIUM AND PIPERACILLIN SODIUM 3.38 G: 375; 3 INJECTION, SOLUTION INTRAVENOUS at 16:55

## 2020-06-02 RX ADMIN — ONDANSETRON 4 MG: 2 INJECTION INTRAMUSCULAR; INTRAVENOUS at 14:04

## 2020-06-02 RX ADMIN — BUPROPION HYDROCHLORIDE 150 MG: 150 TABLET, EXTENDED RELEASE ORAL at 20:05

## 2020-06-02 NOTE — PROGRESS NOTES
"      [facility]    Name:  Triston Villanueva   Age:  34 y.o.  Sex:  male  :  1985  MRN:  [id number]   Visit Number:  [id number]  Admission Date:  2020  Date Of Service:  20  Primary Care Physician:  Provider, No Known     LOS: 1 day :  Patient Care Team:  Provider, No Known as PCP - General:    Chief Complaint:      Abdominal pain    Subjective / Interval History:     Patient seen and evaluated this morning.  No family present this morning.  Patient was groggy but stated that he just woke up.  He denied any nausea or abdominal pain this morning.  Stated that overall he felt improved.  Denied fever chills, chest pain, shortness of breath.  No acute events reported from overnight.    This is a 34-year-old male with history of IV drug abuse, hepatitis C was brought to the emergency room by his sister with complaints of abdominal pain, nausea and vomiting.  Patient states that he he used drugs 4 days ago for about 2 days and then since started feeling generalized weakness associated with abdominal pain, nausea and vomiting.  Patient states that he has a longstanding history of IV heroin use but apparently was clean for about a month before relapsing.  He denies using any other drugs recently.  He started having right upper quadrant abdominal pain associated with nausea and vomiting and he felt that he may have \"messed up with his liver\".  He started having fever earlier today and subsequently came to the emergency room.     In the emergency room, he was afebrile at 97.6 but tachycardic at 114.  Initial blood pressure was 93/67 which improved with IV hydration.  Pulse oxygen saturation was 98% on room air.  Blood work done in the emergency room revealed a creatinine of 16.6 and a BUN of 36.  ALT was 208 and AST was 119.  Lactic acid was 1.8 but his procalcitonin was elevated at 72.  INR was 1.35.  His WBC was 28.95 with 27% bands.  CT of the chest was " unremarkable.  CT of the abdomen however showed gallbladder wall thickening with pericholecystic fluid but no evidence of gallstone or biliary ductal dilation.  Patient was ordered 3 L of normal saline boluses and was started on IV antibiotic therapy with Rocephin and is currently being admitted to the medical floor.    Review of Systems: Reviewed again today    General ROS: Patient denies any fevers, chills or loss of consciousness.  Respiratory ROS: Denies cough or shortness of breath.  Cardiovascular ROS: Denies chest pain or palpitations. No history of exertional chest pain.  Gastrointestinal ROS: Denies abdominal pain with nausea, no vomiting.  No diarrhea.  Neurological ROS: Denies any focal weakness. No loss of consciousness. Denies any numbness.  Dermatological ROS: Denies any redness or pruritis.    Vital Signs:    Temp:  [98.4 °F (36.9 °C)-98.8 °F (37.1 °C)] 98.5 °F (36.9 °C)  Heart Rate:  [] 94  Resp:  [16-20] 16  BP: ()/(49-68) 104/62    Intake and output:    I/O last 3 completed shifts:  In: 7175 [P.O.:960; I.V.:2724; IV Piggyback:3491]  Out: 4200 [Urine:4200]  I/O this shift:  In: -   Out: 850 [Urine:850]    Physical Examination: Examined again today    General Appearance:  Alert and cooperative, not in any acute distress.   Head:  Atraumatic and normocephalic, without obvious abnormality.   Eyes:          PERRLA, conjunctivae and sclerae normal, no Icterus. No pallor. Extraocular movements are within normal limits.   Neck: Supple, trachea midline, no thyromegaly, no carotid bruit.   Lungs:   Chest shape is normal. Breath sounds heard bilaterally equally.  No crackles or wheezing. No Pleural rub or bronchial breathing.   Heart:  Normal S1 and S2, no murmur, no gallop, no rub. No JVD   Abdomen:   Normal bowel sounds, soft, nondistended, does have pain on palpation in right upper quadrant.   Extremities: Moves all extremities well, no edema, no cyanosis, no clubbing.   Skin: No bleeding,  bruising or rash.   Neurologic: Awake, alert and oriented times 3. Moves all 4 extremities equally.     Laboratory results:    Results from last 7 days   Lab Units 06/02/20  0549 06/01/20  0524 05/31/20 2107   SODIUM mmol/L 144 142 138   POTASSIUM mmol/L 4.0 4.0 4.3   CHLORIDE mmol/L 110* 108* 99   CO2 mmol/L 22.3 21.2* 22.4   BUN mg/dL 13 33* 36*   CREATININE mg/dL 1.01 1.58* 2.19*   CALCIUM mg/dL 8.1* 6.9* 8.4*   BILIRUBIN mg/dL 1.4* 1.8* 2.5*   ALK PHOS U/L 103 95 100   ALT (SGPT) U/L 115* 144* 208*   AST (SGOT) U/L 53* 76* 119*   GLUCOSE mg/dL 108* 124* 116*     Results from last 7 days   Lab Units 06/02/20  0549 06/01/20  0523 05/31/20 2107   WBC 10*3/mm3 13.88* 21.69* 28.95*   HEMOGLOBIN g/dL 12.3* 11.8* 14.8   HEMATOCRIT % 36.8* 35.1* 42.6   PLATELETS 10*3/mm3 116* 110* 145     Results from last 7 days   Lab Units 05/31/20  2107   INR  1.35*         Results from last 7 days   Lab Units 05/31/20  2218 05/31/20  2206   BLOODCX  No growth at 24 hours No growth at 24 hours       I have reviewed the patient's laboratory results.    Radiology results:    Imaging Results (Last 24 Hours)     Procedure Component Value Units Date/Time    US Liver [[id number]] Collected:  06/01/20 2019     Updated:  06/01/20 2020    Narrative:       FINAL REPORT    TECHNIQUE:  Ultrasound images of the right upper quadrant were obtained.    CLINICAL HISTORY:  R/O CHOLECYSTITIS    FINDINGS:  Limited images of the pancreas are obscured by bowel gas.  The  liver parenchyma is normal in echogenicity.  There is  nonspecific gallbladder wall thickening measuring up to 6 mm  without gallstones identified.  Common bile duct is borderline  dilated which could be further evaluated with MRCP.  There is a  small amount of fluid adjacent to the liver which may be  reactive.      Impression:       Nonspecific gallbladder wall thickening and borderline common  bile duct dilatation.  This could be further evaluated with  MRCP.  Small amount of free  fluid adjacent to the liver which  may be reactive.    Authenticated by Darien Espinal III, MD on 06/01/2020  08:19:33 PM          I have reviewed the patient's radiology reports.    Medication Review:     I have reviewed the patients active and prn medications.       Sepsis (CMS/HCC)    Acute renal failure (ARF) (CMS/HCC)    Right upper quadrant abdominal pain    Chronic hepatitis C without hepatic coma (CMS/HCC)    Tobacco dependence due to cigarettes    IV drug abuse (CMS/HCC)      Assessment:    1.  Sepsis likely secondary to #2, present on admission.  2.  Suspected acute cholecystitis, present on admission.  3.  Acute renal failure, present on admission.  4.  Chronic hepatitis C.  5.  IV drug abuse.  6.  Chronic tobacco dependence.    Plan:    Continue to monitor patient in the hospital today.  We will continue to follow up on blood cultures, no growth to date.  Liver ultrasound revealed nonspecific gallbladder wall thickening and borderline common bile duct dilation.  Will order MRCP.  Patient responding well to antibiotics, clinically improving.  Continue with Zosyn.  No suspicion for infective endocarditis at this time.  We will continue to monitor closely.  Suspect that CIELO secondary to dehydration.  Creatinine is improving with IV volume resuscitation.  Will continue.  Encouraged patient to discontinue IV drug use and discontinue smoking.  Have spoken with patient about the importance of following up to undergo treatment for hepatitis C.  Have added morphine as needed for pain control.  Concern for withdrawal as patient is a longtime heroin IV user.  Continue with symptomatic support.  Further orders as clinical course dictates.  Anticipate greater than 2 midnight stay.    Tomi Bolaños,   06/02/20  08:47    Dictated utilizing Dragon dictation.

## 2020-06-03 ENCOUNTER — ANESTHESIA (OUTPATIENT)
Dept: PERIOP | Facility: HOSPITAL | Age: 35
End: 2020-06-03

## 2020-06-03 ENCOUNTER — APPOINTMENT (OUTPATIENT)
Dept: GENERAL RADIOLOGY | Facility: HOSPITAL | Age: 35
End: 2020-06-03

## 2020-06-03 ENCOUNTER — ANESTHESIA EVENT (OUTPATIENT)
Dept: PERIOP | Facility: HOSPITAL | Age: 35
End: 2020-06-03

## 2020-06-03 LAB
ANION GAP SERPL CALCULATED.3IONS-SCNC: 10.6 MMOL/L (ref 5–15)
BASOPHILS # BLD AUTO: 0.03 10*3/MM3 (ref 0–0.2)
BASOPHILS NFR BLD AUTO: 0.6 % (ref 0–1.5)
BUN BLD-MCNC: 9 MG/DL (ref 6–20)
BUN/CREAT SERPL: 8.8 (ref 7–25)
CALCIUM SPEC-SCNC: 9 MG/DL (ref 8.6–10.5)
CHLORIDE SERPL-SCNC: 108 MMOL/L (ref 98–107)
CO2 SERPL-SCNC: 24.4 MMOL/L (ref 22–29)
CREAT BLD-MCNC: 1.02 MG/DL (ref 0.76–1.27)
DEPRECATED RDW RBC AUTO: 45 FL (ref 37–54)
EOSINOPHIL # BLD AUTO: 0.09 10*3/MM3 (ref 0–0.4)
EOSINOPHIL NFR BLD AUTO: 1.7 % (ref 0.3–6.2)
ERYTHROCYTE [DISTWIDTH] IN BLOOD BY AUTOMATED COUNT: 13.6 % (ref 12.3–15.4)
GFR SERPL CREATININE-BSD FRML MDRD: 83 ML/MIN/1.73
GLUCOSE BLD-MCNC: 132 MG/DL (ref 65–99)
HAV IGM SERPL QL IA: ABNORMAL
HBV CORE IGM SERPL QL IA: ABNORMAL
HBV SURFACE AG SERPL QL IA: ABNORMAL
HCT VFR BLD AUTO: 36.7 % (ref 37.5–51)
HCV AB SER DONR QL: REACTIVE
HGB BLD-MCNC: 12.1 G/DL (ref 13–17.7)
HOLD SPECIMEN: NORMAL
IMM GRANULOCYTES # BLD AUTO: 0.02 10*3/MM3 (ref 0–0.05)
IMM GRANULOCYTES NFR BLD AUTO: 0.4 % (ref 0–0.5)
LYMPHOCYTES # BLD AUTO: 1.39 10*3/MM3 (ref 0.7–3.1)
LYMPHOCYTES NFR BLD AUTO: 26.9 % (ref 19.6–45.3)
MCH RBC QN AUTO: 29.5 PG (ref 26.6–33)
MCHC RBC AUTO-ENTMCNC: 33 G/DL (ref 31.5–35.7)
MCV RBC AUTO: 89.5 FL (ref 79–97)
MONOCYTES # BLD AUTO: 0.35 10*3/MM3 (ref 0.1–0.9)
MONOCYTES NFR BLD AUTO: 6.8 % (ref 5–12)
NEUTROPHILS # BLD AUTO: 3.29 10*3/MM3 (ref 1.7–7)
NEUTROPHILS NFR BLD AUTO: 63.6 % (ref 42.7–76)
NRBC BLD AUTO-RTO: 0 /100 WBC (ref 0–0.2)
PLATELET # BLD AUTO: 111 10*3/MM3 (ref 140–450)
PMV BLD AUTO: 12.1 FL (ref 6–12)
POTASSIUM BLD-SCNC: 4 MMOL/L (ref 3.5–5.2)
RBC # BLD AUTO: 4.1 10*6/MM3 (ref 4.14–5.8)
SARS-COV-2 RNA PNL SPEC NAA+PROBE: NOT DETECTED
SODIUM BLD-SCNC: 143 MMOL/L (ref 136–145)
WBC NRBC COR # BLD: 5.17 10*3/MM3 (ref 3.4–10.8)

## 2020-06-03 PROCEDURE — 99254 IP/OBS CNSLTJ NEW/EST MOD 60: CPT | Performed by: SURGERY

## 2020-06-03 PROCEDURE — 25010000002 DEXAMETHASONE PER 1 MG: Performed by: NURSE ANESTHETIST, CERTIFIED REGISTERED

## 2020-06-03 PROCEDURE — 94799 UNLISTED PULMONARY SVC/PX: CPT

## 2020-06-03 PROCEDURE — 25010000002 IOPAMIDOL 61 % SOLUTION: Performed by: SURGERY

## 2020-06-03 PROCEDURE — 85025 COMPLETE CBC W/AUTO DIFF WBC: CPT | Performed by: INTERNAL MEDICINE

## 2020-06-03 PROCEDURE — 87635 SARS-COV-2 COVID-19 AMP PRB: CPT | Performed by: INTERNAL MEDICINE

## 2020-06-03 PROCEDURE — BF101ZZ FLUOROSCOPY OF BILE DUCTS USING LOW OSMOLAR CONTRAST: ICD-10-PCS | Performed by: SURGERY

## 2020-06-03 PROCEDURE — 25010000002 PIPERACILLIN SOD-TAZOBACTAM PER 1 G: Performed by: INTERNAL MEDICINE

## 2020-06-03 PROCEDURE — 25010000002 PIPERACILLIN SOD-TAZOBACTAM PER 1 G: Performed by: SURGERY

## 2020-06-03 PROCEDURE — 25010000002 SUCCINYLCHOLINE PER 20 MG: Performed by: NURSE ANESTHETIST, CERTIFIED REGISTERED

## 2020-06-03 PROCEDURE — 25010000002 FENTANYL CITRATE (PF) 100 MCG/2ML SOLUTION: Performed by: NURSE ANESTHETIST, CERTIFIED REGISTERED

## 2020-06-03 PROCEDURE — 25010000002 ONDANSETRON PER 1 MG: Performed by: SURGERY

## 2020-06-03 PROCEDURE — 47563 LAPARO CHOLECYSTECTOMY/GRAPH: CPT | Performed by: SURGERY

## 2020-06-03 PROCEDURE — 74300 X-RAY BILE DUCTS/PANCREAS: CPT

## 2020-06-03 PROCEDURE — 80048 BASIC METABOLIC PNL TOTAL CA: CPT | Performed by: INTERNAL MEDICINE

## 2020-06-03 PROCEDURE — 99232 SBSQ HOSP IP/OBS MODERATE 35: CPT | Performed by: NURSE PRACTITIONER

## 2020-06-03 PROCEDURE — 25010000002 ONDANSETRON PER 1 MG: Performed by: NURSE ANESTHETIST, CERTIFIED REGISTERED

## 2020-06-03 PROCEDURE — 25010000002 KETOROLAC TROMETHAMINE PER 15 MG: Performed by: NURSE ANESTHETIST, CERTIFIED REGISTERED

## 2020-06-03 PROCEDURE — 88304 TISSUE EXAM BY PATHOLOGIST: CPT | Performed by: SURGERY

## 2020-06-03 PROCEDURE — 25010000002 MORPHINE PER 10 MG: Performed by: SURGERY

## 2020-06-03 PROCEDURE — 0FT44ZZ RESECTION OF GALLBLADDER, PERCUTANEOUS ENDOSCOPIC APPROACH: ICD-10-PCS | Performed by: SURGERY

## 2020-06-03 PROCEDURE — 25010000002 HYDROMORPHONE PER 4 MG: Performed by: NURSE ANESTHETIST, CERTIFIED REGISTERED

## 2020-06-03 DEVICE — LIGACLIP 10-M/L, 10MM ENDOSCOPIC ROTATING MULTIPLE CLIP APPLIERS
Type: IMPLANTABLE DEVICE | Site: ABDOMEN | Status: FUNCTIONAL
Brand: LIGACLIP

## 2020-06-03 RX ORDER — MORPHINE SULFATE 2 MG/ML
2 INJECTION, SOLUTION INTRAMUSCULAR; INTRAVENOUS
Status: DISCONTINUED | OUTPATIENT
Start: 2020-06-03 | End: 2020-06-03 | Stop reason: HOSPADM

## 2020-06-03 RX ORDER — SODIUM CHLORIDE 0.9 % (FLUSH) 0.9 %
3 SYRINGE (ML) INJECTION EVERY 12 HOURS SCHEDULED
Status: DISCONTINUED | OUTPATIENT
Start: 2020-06-03 | End: 2020-06-04 | Stop reason: HOSPADM

## 2020-06-03 RX ORDER — SODIUM CHLORIDE 9 MG/ML
100 INJECTION, SOLUTION INTRAVENOUS CONTINUOUS
Status: DISCONTINUED | OUTPATIENT
Start: 2020-06-03 | End: 2020-06-03

## 2020-06-03 RX ORDER — HYDROMORPHONE HCL 110MG/55ML
0.5 PATIENT CONTROLLED ANALGESIA SYRINGE INTRAVENOUS
Status: DISCONTINUED | OUTPATIENT
Start: 2020-06-03 | End: 2020-06-04 | Stop reason: HOSPADM

## 2020-06-03 RX ORDER — DEXTROSE AND SODIUM CHLORIDE 5; .45 G/100ML; G/100ML
100 INJECTION, SOLUTION INTRAVENOUS CONTINUOUS
Status: DISCONTINUED | OUTPATIENT
Start: 2020-06-03 | End: 2020-06-04 | Stop reason: HOSPADM

## 2020-06-03 RX ORDER — SUCCINYLCHOLINE CHLORIDE 20 MG/ML
INJECTION INTRAMUSCULAR; INTRAVENOUS AS NEEDED
Status: DISCONTINUED | OUTPATIENT
Start: 2020-06-03 | End: 2020-06-03 | Stop reason: SURG

## 2020-06-03 RX ORDER — ROCURONIUM BROMIDE 50 MG/5 ML
SYRINGE (ML) INTRAVENOUS AS NEEDED
Status: DISCONTINUED | OUTPATIENT
Start: 2020-06-03 | End: 2020-06-03

## 2020-06-03 RX ORDER — LIDOCAINE HYDROCHLORIDE 20 MG/ML
INJECTION, SOLUTION INTRAVENOUS AS NEEDED
Status: DISCONTINUED | OUTPATIENT
Start: 2020-06-03 | End: 2020-06-03 | Stop reason: SURG

## 2020-06-03 RX ORDER — NALOXONE HCL 0.4 MG/ML
0.1 VIAL (ML) INJECTION
Status: DISCONTINUED | OUTPATIENT
Start: 2020-06-03 | End: 2020-06-04 | Stop reason: HOSPADM

## 2020-06-03 RX ORDER — HYDROCODONE BITARTRATE AND ACETAMINOPHEN 7.5; 325 MG/1; MG/1
1 TABLET ORAL EVERY 6 HOURS PRN
Status: DISCONTINUED | OUTPATIENT
Start: 2020-06-03 | End: 2020-06-04 | Stop reason: HOSPADM

## 2020-06-03 RX ORDER — DEXAMETHASONE SODIUM PHOSPHATE 4 MG/ML
INJECTION, SOLUTION INTRA-ARTICULAR; INTRALESIONAL; INTRAMUSCULAR; INTRAVENOUS; SOFT TISSUE AS NEEDED
Status: DISCONTINUED | OUTPATIENT
Start: 2020-06-03 | End: 2020-06-03 | Stop reason: SURG

## 2020-06-03 RX ORDER — MEPERIDINE HYDROCHLORIDE 25 MG/ML
25 INJECTION INTRAMUSCULAR; INTRAVENOUS; SUBCUTANEOUS ONCE
Status: DISCONTINUED | OUTPATIENT
Start: 2020-06-03 | End: 2020-06-03 | Stop reason: HOSPADM

## 2020-06-03 RX ORDER — NEOSTIGMINE METHYLSULFATE 5 MG/5 ML
SYRINGE (ML) INTRAVENOUS AS NEEDED
Status: DISCONTINUED | OUTPATIENT
Start: 2020-06-03 | End: 2020-06-03 | Stop reason: SURG

## 2020-06-03 RX ORDER — LORAZEPAM 2 MG/ML
0.5 INJECTION INTRAMUSCULAR
Status: DISCONTINUED | OUTPATIENT
Start: 2020-06-03 | End: 2020-06-03 | Stop reason: HOSPADM

## 2020-06-03 RX ORDER — KETOROLAC TROMETHAMINE 30 MG/ML
INJECTION, SOLUTION INTRAMUSCULAR; INTRAVENOUS AS NEEDED
Status: DISCONTINUED | OUTPATIENT
Start: 2020-06-03 | End: 2020-06-03 | Stop reason: SURG

## 2020-06-03 RX ORDER — BUPIVACAINE HYDROCHLORIDE 5 MG/ML
INJECTION, SOLUTION EPIDURAL; INTRACAUDAL AS NEEDED
Status: DISCONTINUED | OUTPATIENT
Start: 2020-06-03 | End: 2020-06-03 | Stop reason: HOSPADM

## 2020-06-03 RX ORDER — FENTANYL CITRATE 50 UG/ML
INJECTION, SOLUTION INTRAMUSCULAR; INTRAVENOUS AS NEEDED
Status: DISCONTINUED | OUTPATIENT
Start: 2020-06-03 | End: 2020-06-03 | Stop reason: SURG

## 2020-06-03 RX ORDER — ROCURONIUM BROMIDE 10 MG/ML
INJECTION, SOLUTION INTRAVENOUS AS NEEDED
Status: DISCONTINUED | OUTPATIENT
Start: 2020-06-03 | End: 2020-06-03 | Stop reason: SURG

## 2020-06-03 RX ORDER — HYDROMORPHONE HCL 110MG/55ML
PATIENT CONTROLLED ANALGESIA SYRINGE INTRAVENOUS AS NEEDED
Status: DISCONTINUED | OUTPATIENT
Start: 2020-06-03 | End: 2020-06-03 | Stop reason: SURG

## 2020-06-03 RX ORDER — SODIUM CHLORIDE 0.9 % (FLUSH) 0.9 %
3-10 SYRINGE (ML) INJECTION AS NEEDED
Status: DISCONTINUED | OUTPATIENT
Start: 2020-06-03 | End: 2020-06-04 | Stop reason: HOSPADM

## 2020-06-03 RX ORDER — ONDANSETRON 2 MG/ML
INJECTION INTRAMUSCULAR; INTRAVENOUS AS NEEDED
Status: DISCONTINUED | OUTPATIENT
Start: 2020-06-03 | End: 2020-06-03 | Stop reason: SURG

## 2020-06-03 RX ORDER — KETAMINE HCL IN NACL, ISO-OSM 100MG/10ML
SYRINGE (ML) INJECTION AS NEEDED
Status: DISCONTINUED | OUTPATIENT
Start: 2020-06-03 | End: 2020-06-03 | Stop reason: SURG

## 2020-06-03 RX ADMIN — ONDANSETRON 4 MG: 2 INJECTION INTRAMUSCULAR; INTRAVENOUS at 16:28

## 2020-06-03 RX ADMIN — MUPIROCIN: 20 OINTMENT TOPICAL at 10:12

## 2020-06-03 RX ADMIN — TAZOBACTAM SODIUM AND PIPERACILLIN SODIUM 3.38 G: 375; 3 INJECTION, SOLUTION INTRAVENOUS at 16:20

## 2020-06-03 RX ADMIN — MORPHINE SULFATE 4 MG: 4 INJECTION, SOLUTION INTRAMUSCULAR; INTRAVENOUS at 18:51

## 2020-06-03 RX ADMIN — HYDROMORPHONE HYDROCHLORIDE 1.6 MG: 2 INJECTION, SOLUTION INTRAMUSCULAR; INTRAVENOUS; SUBCUTANEOUS at 16:47

## 2020-06-03 RX ADMIN — TAZOBACTAM SODIUM AND PIPERACILLIN SODIUM 3.38 G: 375; 3 INJECTION, SOLUTION INTRAVENOUS at 08:15

## 2020-06-03 RX ADMIN — OXYCODONE HYDROCHLORIDE AND ACETAMINOPHEN 1 TABLET: 5; 325 TABLET ORAL at 12:25

## 2020-06-03 RX ADMIN — MIRTAZAPINE 15 MG: 15 TABLET, FILM COATED ORAL at 21:29

## 2020-06-03 RX ADMIN — OXYCODONE HYDROCHLORIDE AND ACETAMINOPHEN 1 TABLET: 5; 325 TABLET ORAL at 08:10

## 2020-06-03 RX ADMIN — ONDANSETRON 4 MG: 2 INJECTION INTRAMUSCULAR; INTRAVENOUS at 18:51

## 2020-06-03 RX ADMIN — SODIUM CHLORIDE, PRESERVATIVE FREE 10 ML: 5 INJECTION INTRAVENOUS at 21:42

## 2020-06-03 RX ADMIN — BUPROPION HYDROCHLORIDE 150 MG: 150 TABLET, EXTENDED RELEASE ORAL at 21:40

## 2020-06-03 RX ADMIN — TAZOBACTAM SODIUM AND PIPERACILLIN SODIUM 3.38 G: 375; 3 INJECTION, SOLUTION INTRAVENOUS at 21:45

## 2020-06-03 RX ADMIN — HYDROMORPHONE HYDROCHLORIDE 0.4 MG: 2 INJECTION, SOLUTION INTRAMUSCULAR; INTRAVENOUS; SUBCUTANEOUS at 16:25

## 2020-06-03 RX ADMIN — ROCURONIUM BROMIDE 10 MG: 10 INJECTION INTRAVENOUS at 16:25

## 2020-06-03 RX ADMIN — SODIUM CHLORIDE 100 ML/HR: 9 INJECTION, SOLUTION INTRAVENOUS at 15:48

## 2020-06-03 RX ADMIN — Medication 1 CAPSULE: at 08:10

## 2020-06-03 RX ADMIN — GLYCOPYRROLATE 0.4 MG: 0.2 INJECTION, SOLUTION INTRAMUSCULAR; INTRAVENOUS at 16:55

## 2020-06-03 RX ADMIN — DEXAMETHASONE SODIUM PHOSPHATE 4 MG: 4 INJECTION, SOLUTION INTRAMUSCULAR; INTRAVENOUS at 16:28

## 2020-06-03 RX ADMIN — Medication 4 MG: at 16:55

## 2020-06-03 RX ADMIN — MUPIROCIN: 20 OINTMENT TOPICAL at 21:39

## 2020-06-03 RX ADMIN — MELATONIN TAB 5 MG 5 MG: 5 TAB at 21:29

## 2020-06-03 RX ADMIN — NICOTINE 1 PATCH: 21 PATCH TRANSDERMAL at 08:09

## 2020-06-03 RX ADMIN — ACETAMINOPHEN 650 MG: 325 TABLET, FILM COATED ORAL at 08:10

## 2020-06-03 RX ADMIN — OXYCODONE HYDROCHLORIDE AND ACETAMINOPHEN 1 TABLET: 5; 325 TABLET ORAL at 04:02

## 2020-06-03 RX ADMIN — TAZOBACTAM SODIUM AND PIPERACILLIN SODIUM 3.38 G: 375; 3 INJECTION, SOLUTION INTRAVENOUS at 02:30

## 2020-06-03 RX ADMIN — SUCCINYLCHOLINE CHLORIDE 180 MG: 20 INJECTION, SOLUTION INTRAMUSCULAR; INTRAVENOUS at 16:25

## 2020-06-03 RX ADMIN — KETOROLAC TROMETHAMINE 60 MG: 30 INJECTION, SOLUTION INTRAMUSCULAR at 16:28

## 2020-06-03 RX ADMIN — LIDOCAINE HYDROCHLORIDE 60 MG: 20 INJECTION, SOLUTION INTRAVENOUS at 16:25

## 2020-06-03 RX ADMIN — SODIUM CHLORIDE 100 ML/HR: 9 INJECTION, SOLUTION INTRAVENOUS at 10:12

## 2020-06-03 RX ADMIN — FENTANYL CITRATE 100 MCG: 50 INJECTION INTRAMUSCULAR; INTRAVENOUS at 16:25

## 2020-06-03 RX ADMIN — BUPROPION HYDROCHLORIDE 150 MG: 150 TABLET, EXTENDED RELEASE ORAL at 08:10

## 2020-06-03 RX ADMIN — Medication 1 CAPSULE: at 21:40

## 2020-06-03 RX ADMIN — DEXTROSE AND SODIUM CHLORIDE 100 ML/HR: 5; 450 INJECTION, SOLUTION INTRAVENOUS at 18:51

## 2020-06-03 RX ADMIN — HYDROCODONE BITARTRATE AND ACETAMINOPHEN 1 TABLET: 7.5; 325 TABLET ORAL at 20:32

## 2020-06-03 RX ADMIN — TAZOBACTAM SODIUM AND PIPERACILLIN SODIUM 3.38 G: 375; 3 INJECTION, SOLUTION INTRAVENOUS at 15:30

## 2020-06-03 RX ADMIN — OXYCODONE HYDROCHLORIDE AND ACETAMINOPHEN 1 TABLET: 5; 325 TABLET ORAL at 23:08

## 2020-06-03 RX ADMIN — Medication 25 MG: at 16:25

## 2020-06-03 RX ADMIN — ROCURONIUM BROMIDE 30 MG: 10 INJECTION INTRAVENOUS at 16:36

## 2020-06-03 NOTE — CONSULTS
Reason for Consultation: Cholecystitis      Chief complaint:  Right upper quadrant pain    SUBJECTIVE:    History of present illness:  I did see the patient in the hospital today as a consultation for evaluation and treatment of a 4-day history of increasing right upper quadrant and midepigastric abdominal discomfort.  Apparently last Saturday, 4 days ago, the patient had an episode of acute onset right upper quadrant abdominal discomfort that radiated into his back associated with nausea and vomiting and questionable low-grade fever.  He was subsequently seen in the emergency room on Sunday and subsequently admitted to the hospitalist service.  I was asked to see the patient today from a general surgery standpoint.    The patient states this pain is in the right upper quadrant, sharp in nature, radiates into the back, associated with nausea, never felt like this in the past, seem to be made worse in the past with meals, not improved except over time since he has been in the hospital.  He did have a previous work-up including liver ultrasound that showed evidence of a thickened gallbladder wall and pericholecystic fluid.  There was a CT scan of the abdomen and pelvis as well as an MRCP that did show evidence of thickened gallbladder wall and pericholecystic fluid, the MRCP performed yesterday did show worsening fluid collection now in the right gutter.    The patient's history is significant for heroin abuse and he last used IV heroin this past Saturday, he states he had used it 6 days continuously prior to Saturday.  He does have a longstanding history of hepatitis C but has never been treated.    Review of Systems:    Review of Systems - General ROS: negative for - chills, fatigue, fever, hot flashes, malaise or night sweats  Psychological ROS: negative for - behavioral disorder, disorientation, hallucinations, hostility or mood swings  ENT ROS: negative for - nasal polyps, oral lesions, sinus pain, sneezing  or sore throat  Breast ROS: negative for - galactorrhea or new or changing breast lumps  Respiratory ROS: negative for - hemoptysis, orthopnea, pleuritic pain, sputum changes or stridor  Cardiovascular ROS: negative for - dyspnea on exertion, edema, irregular heartbeat, murmur, orthopnea, palpitations or rapid heart rate  Gastrointestinal ROS: negative for - change in stools, gas/bloating, hematemesis, melena or stool incontinence. There is a history of nausea with right upper quadrant pain  Genito-Urinary ROS: negative for - dysuria, genital ulcers, nocturia or pelvic pain  Musculoskeletal ROS: negative for - gait disturbance or muscle pain  Neurological ROS: negative for - dizziness, gait disturbance, memory loss, numbness/tingling or seizures      Allergies:  No Known Allergies    Medications:    Current Facility-Administered Medications:   •  acetaminophen (TYLENOL) tablet 650 mg, 650 mg, Oral, Q4H PRN, 650 mg at 06/03/20 0810 **OR** acetaminophen (TYLENOL) 160 MG/5ML solution 650 mg, 650 mg, Oral, Q4H PRN **OR** acetaminophen (TYLENOL) suppository 650 mg, 650 mg, Rectal, Q4H PRN, Geovany Gerber MD  •  buPROPion SR (WELLBUTRIN SR) 12 hr tablet 150 mg, 150 mg, Oral, Q12H, Tomi Bolaños DO, 150 mg at 06/03/20 0810  •  lactobacillus acidophilus (RISAQUAD) capsule 1 capsule, 1 capsule, Oral, BID, Geovany Gerber MD, 1 capsule at 06/03/20 0810  •  melatonin tablet 5 mg, 5 mg, Oral, Nightly PRN, Meg Knowles DO, 5 mg at 06/02/20 2324  •  mirtazapine (REMERON) tablet 15 mg, 15 mg, Oral, Nightly PRN, Meg Knowles DO, 15 mg at 06/02/20 2324  •  Morphine sulfate (PF) injection 2 mg, 2 mg, Intravenous, Q4H PRN, Tomi Bolaños DO, 2 mg at 06/01/20 1717  •  Morphine sulfate (PF) injection 4 mg, 4 mg, Intravenous, Q4H PRN, 4 mg at 06/01/20 2153 **AND** naloxone (NARCAN) injection 0.4 mg, 0.4 mg, Intravenous, Q5 Min PRN, Geovany Gerber MD  •  mupirocin (BACTROBAN) 2 % ointment, , Topical, Q12H,  Lisa Talley, JUSTICE  •  nicotine (NICODERM CQ) 21 MG/24HR patch 1 patch, 1 patch, Transdermal, Q24H, Geovany Gerber MD, 1 patch at 06/03/20 0809  •  ondansetron (ZOFRAN) injection 4 mg, 4 mg, Intravenous, Q6H PRN, Geovany Gerber MD, 4 mg at 06/02/20 1404  •  oxyCODONE-acetaminophen (PERCOCET) 5-325 MG per tablet 1 tablet, 1 tablet, Oral, Q4H PRN, Meg Knowles DO, 1 tablet at 06/03/20 0810  •  Pharmacy to Dose Zosyn, , Does not apply, Continuous PRN, Geovany Gerber MD  •  piperacillin-tazobactam (ZOSYN) 3.375 g in iso-osmotic dextrose 50 ml (premix), 3.375 g, Intravenous, Q8H, Geovany Gerber MD, 3.375 g at 06/03/20 0815  •  [COMPLETED] Insert peripheral IV, , , Once **AND** sodium chloride 0.9 % flush 10 mL, 10 mL, Intravenous, PRN, Geovany Gerber MD  •  sodium chloride 0.9 % flush 10 mL, 10 mL, Intravenous, Q12H, Geovany Gerber MD, 10 mL at 06/02/20 2008  •  sodium chloride 0.9 % flush 10 mL, 10 mL, Intravenous, PRN, Geovany Gerber MD  •  sodium chloride 0.9 % infusion, 100 mL/hr, Intravenous, Continuous, Geovany Gerber MD, Last Rate: 100 mL/hr at 06/02/20 1655, 100 mL/hr at 06/02/20 1655    History:  Past Medical History:   Diagnosis Date   • Hepatitis C        History reviewed. No pertinent surgical history.    History reviewed. No pertinent family history.    Social History     Tobacco Use   • Smoking status: Current Every Day Smoker     Packs/day: 0.50     Types: Cigarettes   Substance Use Topics   • Alcohol use: Never     Frequency: Never   • Drug use: Yes     Types: IV     Comment: relapsed          OBJECTIVE:    Vital Signs   Temp:  [97.6 °F (36.4 °C)-98.6 °F (37 °C)] 97.6 °F (36.4 °C)  Heart Rate:  [72-86] 86  Resp:  [16-18] 18  BP: (108-129)/(68-85) 120/78    Physical Exam:     General Appearance:    Alert, cooperative, in no acute distress   Head:    Normocephalic, without obvious abnormality, atraumatic   Eyes:            Lids and lashes normal, conjunctivae and sclerae normal, no   icterus, no  pallor, corneas clear, PERRLA   Ears:    Ears appear intact with no abnormalities noted   Throat:   No oral lesions, no thrush, oral mucosa moist   Neck:   No adenopathy, supple, trachea midline, no thyromegaly, no   carotid bruit, no JVD   Back:     No kyphosis present, no scoliosis present, no skin lesions,      erythema or scars, no tenderness to percussion or                   palpation,   range of motion normal   Lungs:     Clear to auscultation,respirations regular, even and                  unlabored    Heart:    Regular rhythm and normal rate, normal S1 and S2, no            murmur, no gallop, no rub, no click   Chest Wall:    No abnormalities observed   Abdomen:     Normal bowel sounds, no masses, no organomegaly, soft        non-tender, non-distended, no guarding, there is evidence of right upper quadrant tenderness   Extremities:   Moves all extremities well, no edema, no cyanosis, no             redness   Pulses:   Pulses palpable and equal bilaterally   Skin:   No bleeding, bruising or rash   Lymph nodes:   No palpable adenopathy   Neurologic:   Cranial nerves 2 - 12 grossly intact, sensation intact, DTR       present and equal bilaterally       Results Review:   I reviewed the patient's new clinical results.  I reviewed the patient's new imaging results and agree with the interpretation.  I reviewed the patient's other test results and agree with the interpretation      ASSESSMENT/PLAN:    Acute cholecystitis    I did have a detailed and extensive discussion with the patient in the hospital and they understand that they need to undergo laparoscopic cholecystectomy with intraoperative cholangiography or possible open cholecystectomy. Full risks and benefits of operative versus nonoperative intervention were discussed with the patient and these included things such as nonresolution of symptoms and possible worsening of symptoms without surgical intervention versus infection, bleeding, open  cholecystectomy, common bile duct injury, postoperative biliary leakage, need for drain placement, possible inability to perform cholangiography due to inflammation, postoperative abscess, etc with surgical intervention. The patient understands, agrees, and wishes to proceed with the surgical treatment plan as mentioned above. The patient had no questions for me at the end of the discussion.  I did draw a picture of the anatomy for the patient and used this in my informed consent.     I discussed the patients findings and my recommendations with patient and consulting provider.    I have had multiple discussions with the hospitalist today, I discussed the situation with the nursing staff.  I reviewed all his old records, his previous ER visits, and his previous CT scan of the abdomen pelvis, gallbladder ultrasound, and MRCP that does show evidence of pericholecystic fluid and a thickened gallbladder wall.  I have also reviewed the above-mentioned x-rays with the radiologist today personally.    George Day MD  06/03/20  09:21

## 2020-06-03 NOTE — OP NOTE
PATIENT:     Triston Villanueva    DATE OF SURGERY:     6/3/2020    PHYSICIAN:   George Day MD    REFERRING PHYSICIAN:  Provider, No Known    YOB: 1985    PREOPERATIVE DIAGNOSIS:  Acute cholecystitis due to cholelithiasis    POSTOPERATIVE DIAGNOSIS:  Acute cholecystitis due to cholelithiasis    PROCEDURE:  Laparoscopic cholecystectomy with intraoperative cholangiography.    ANESTHESIA:  General endotracheal.    HISTORY:  The patient was sent to me as a consultation via Provider, No Known for evaluation and treatment of chronic right upper quadrant and mid epigastric abdominal discomfort.  Workup was begun and the patient was subsequently found to have chronic cholecystitis due to cholelithiasis.  The patient is here now today for elective laparoscopic cholecystectomy with intraoperative cholangiography for treatment of chronic cholecystitis.  The procedure was discussed with the patient preoperatively who understands, agrees, and wishes to proceed with the above-mentioned procedure in an elective outpatient fashion.      OPERATIVE PROCEDURE:  The patient was taken to the operating room, placed in the supine position, and given general endotracheal anesthesia.  The patient was prepped and draped in the normal sterile fashion, and also received preoperative IV antibiotics.  An appropriate timeout was performed by the nursing staff prior to the incision.  I did discuss the situation with the patient preoperatively.      An umbilical incision was then made to insert a Veress needle to insufflate the abdomen with carbon dioxide, and a separate 5 mm port was inserted here along with a camera via an Optiview.  A separate subxiphoid port was inserted along with two right subcostal 5 mm ports.  The gallbladder was well visualized.    Good exposure was obtained, and the gallbladder was grasped at its infundibulum and its fundus and retracted superiorly and laterally.  There were some acute attachments of  fatty tissue to the gallbladder indicative of acute cholecystitis and these were easily taken down.  There was greenish discoloration of the gallbladder indicative of acute inflammation.    Good exposure was obtained and dissection was performed in the triangle of Calot, and the cystic duct and cystic artery were identified in the normal manner.  A clip was then placed on the cystic duct proximally and then two clips were placed on the cystic artery proximally and one distally.  Cystic ductotomy was performed.  A separate cholangiogram catheter had been inserted through a right upper quadrant introducer port and then this was fed into the cystic duct itself and a clip was applied.     Intraoperative cholangiography was then performed under fluoroscopy, carefully evaluating the biliary ductal anatomy.  This was done without difficulty.  The right and left hepatic ducts were well visualized as well as the common hepatic duct.  The common bile duct was well visualized, as was the duodenum.  There was nice flow into the duodenum and there was no evidence of biliary ductal obstruction or choledocholithiasis.  There was ample distance between the cystic ductotomy and the cystic duct/common duct junction.  I did feel comfortable performing the procedure laparoscopically.    The cholangiogram catheter was removed.  The cystic duct was clipped twice distally and then divided, as was the cystic artery.  Bovie electrocautery was then used to remove the gallbladder from the liver bed.  It was then removed via the subxiphoid port site without difficulty.     Copious irrigation was used in the patient’s abdominal cavity.  It was clean and dry at this point.  Hemostasis was intact and there was no evidence of bilious leakage.  All trocar sites were injected with a local anesthetic mixture.  Trocars were removed under direct vision and the fascia was closed with 0-Vicryl suture and 4-0 Vicryl subcuticular stitch along with  Steri-Strips for skin reapproximation.      The patient was stable at this point and subsequently transferred back to the recovery room in stable condition.    George Day MD

## 2020-06-03 NOTE — ANESTHESIA PREPROCEDURE EVALUATION
Anesthesia Evaluation     Patient summary reviewed and Nursing notes reviewed   no history of anesthetic complications:  NPO Solid Status: > 8 hours  NPO Liquid Status: > 8 hours           Airway   Mallampati: II  TM distance: >3 FB  Neck ROM: full  No difficulty expected  Dental    (+) poor dentition    Pulmonary - normal exam   (+) a smoker Current Abstained day of surgery,   Cardiovascular - negative cardio ROS and normal exam  Exercise tolerance: good (4-7 METS)        Neuro/Psych- negative ROS  GI/Hepatic/Renal/Endo    (+)   hepatitis C, liver disease, renal disease ARF,     Musculoskeletal     Abdominal    Substance History   (+) drug use (herione use last March 28, suboxone)     OB/GYN          Other        ROS/Med Hx Other: 12/36  K 4.0                  Anesthesia Plan    ASA 3 - emergent     general   (Risks and benefits discussed including risk of aspiration, recall and dental damage. All patient questions answered. Will continue with POC.)  intravenous induction     Anesthetic plan, all risks, benefits, and alternatives have been provided, discussed and informed consent has been obtained with: patient.

## 2020-06-03 NOTE — ANESTHESIA PROCEDURE NOTES
Airway  Urgency: elective    Date/Time: 6/3/2020 4:26 PM  Airway not difficult    General Information and Staff    Patient location during procedure: OR  CRNA: Irvin Yusuf CRNA    Indications and Patient Condition  Indications for airway management: airway protection    Preoxygenated: yes  Mask difficulty assessment: 0 - not attempted    Final Airway Details  Final airway type: endotracheal airway      Successful airway: ETT  Cuffed: yes   Successful intubation technique: direct laryngoscopy and RSI  Endotracheal tube insertion site: oral  Blade: Verduzco  Blade size: 2  ETT size (mm): 7.0  Cormack-Lehane Classification: grade I - full view of glottis  Placement verified by: chest auscultation and capnometry   Measured from: lips  ETT/EBT  to lips (cm): 21  Number of attempts at approach: 1    Additional Comments  Dentition and Lips as preoperative assesment

## 2020-06-03 NOTE — PROGRESS NOTES
Continued Stay Note   Kvng     Patient Name: Triston Villanueva  MRN: [id number]  Today's Date: 6/3/2020    Admit Date: 5/31/2020    Discharge Plan     Row Name 06/03/20 1049       Plan    Plan Spoke to pt in room and gave information packet for Substance abuse resources in [facility].  States he knows all about drug rehab, states he has friends in Drug rehab.  Pt is going to OR today for a lap Choley.  Cm will continue to follow.          Discharge Codes    No documentation.       Expected Discharge Date and Time     Expected Discharge Date Expected Discharge Time    Mark 3, 2020             Alcira Hernandez RN

## 2020-06-03 NOTE — ANESTHESIA POSTPROCEDURE EVALUATION
Patient: Triston Villanueva    Procedure Summary     Date:  06/03/20 Room / Location:  [facility] OR  /  [facility] OR    Anesthesia Start:  1620 Anesthesia Stop:      Procedure:  CHOLECYSTECTOMY LAPAROSCOPIC INTRAOPERATIVE CHOLANGIOGRAPHY (N/A Abdomen) Diagnosis:       Right upper quadrant abdominal pain      (Right upper quadrant abdominal pain [R10.11])    Surgeon:  George Day MD Provider:  Irvin Yusuf CRNA    Anesthesia Type:  general ASA Status:  3 - Emergent          Anesthesia Type: general    Vitals  HR 68  Sat 96  Resp 24  /76  Temp 98.3        Post Anesthesia Care and Evaluation    Patient location during evaluation: PACU  Patient participation: complete - patient participated  Level of consciousness: awake and alert  Pain score: 0  Pain management: satisfactory to patient  Airway patency: patent  Anesthetic complications: No anesthetic complications  PONV Status: none  Cardiovascular status: acceptable and stable  Respiratory status: acceptable and face mask  Hydration status: acceptable

## 2020-06-03 NOTE — PROGRESS NOTES
PROGRESS NOTE        Date of Admission: 5/31/2020  Length of Stay: 2  Primary Care Physician: Provider, No Known    Subjective   Chief Complaint: Abdominal pain, nausea, vomiting  HPI: This is a 34-year-old male with history of IV drug abuse, hepatitis C who was brought into the emergency room with complaints of abdominal pain nausea and vomiting.  According to the patient at that time he had used drugs 4 days prior and started feeling generally weak with abdominal pain nausea and vomiting.  He has a longstanding history of IV heroin abuse however he had been clean for about 1 month prior to relapsing.  Upon evaluation in the emergency room he was noted to have elevated creatinine 1.6 and elevated liver enzymes.  He does have a history of hepatitis C.  CT scan of the abdomen pelvis did show gallbladder wall thickening with pericholecystic fluid but no evidence of gallstones or biliary duct dilation.  This was followed up with a right upper quadrant ultrasound which showed nonspecific gallbladder wall thickening with borderline common duct dilation.  They felt it could be further evaluated with an MRCP which was done yesterday.  The MRCP did not show any evidence of bile duct stone but he did have marketed gallbladder wall thickening with pericholecystic fluid.  He has been on IV antibiotics in the form of Zosyn.  I have consulted Dr. Day with general surgery for further evaluation.         Review Of Systems:   Review of Systems   General ROS: Patient denies any fevers, chills or loss of consciousness.    Respiratory ROS: Denies cough or shortness of breath.   Cardiovascular ROS: Denies chest pain or palpitations. No history of exertional chest pain.   Gastrointestinal ROS:  nausea and vomiting is better.   Genito-Urinary ROS: Denies dysuria or hematuria.  Musculoskeletal ROS: Denies back pain. No muscle pain. No calf pain.   Neurological ROS: Denies any focal weakness. No loss of consciousness. Denies any  numbness. Denies neck pain.   Dermatological ROS: pustulant pimples to left side of nose    Objective      Temp:  [97.6 °F (36.4 °C)-98.6 °F (37 °C)] 97.6 °F (36.4 °C)  Heart Rate:  [72-86] 86  Resp:  [16-18] 18  BP: (108-129)/(68-85) 120/78  Physical Exam    General Appearance:  Alert and cooperative, not in any acute distress.   Head:  Atraumatic and normocephalic, without obvious abnormality.   Eyes:          PERRLA, conjunctivae and sclerae normal, no Icterus. No pallor. Extraocular movements are within normal limits.   Ears:  Ears appear intact with no abnormalities noted.   Throat: No oral lesions, no thrush, oral mucosa moist.   Neck: Supple, trachea midline, no thyromegaly, no carotid bruit.       Lungs:   Chest shape is normal. Breath sounds heard bilaterally equally.  No crackles or wheezing. No Pleural rub or bronchial breathing.   Heart:  Normal S1 and S2, no murmur, no gallop, no rub. No JVD   Abdomen:   Normal bowel sounds, no masses, no organomegaly. Soft    non-tender, non-distended, no guarding, no rebound             tenderness   Extremities: Moves all extremities well, no edema, no cyanosis, no clubbing.   Pulses: Pulses palpable and equal bilaterally   Skin: No bleeding, bruising, several of what appear to be acne pustules to left side of nose.       Neurologic:    Psychiatric/Behavior:     Cranial nerves 2 - 12 grossly intact, sensation intact, Motor power is normal and equal bilaterally.  Mood normal, behavior normal       Results Review:    I have reviewed the labs, radiology results and diagnostic studies.    Results from last 7 days   Lab Units 06/03/20  0524   WBC 10*3/mm3 5.17   HEMOGLOBIN g/dL 12.1*   PLATELETS 10*3/mm3 111*     Results from last 7 days   Lab Units 06/03/20  0524   SODIUM mmol/L 143   POTASSIUM mmol/L 4.0   CO2 mmol/L 24.4   CREATININE mg/dL 1.02   GLUCOSE mg/dL 132*       Culture Data:   Blood Culture   Date Value Ref Range Status   05/31/2020 No growth at 2 days   Preliminary   05/31/2020 No growth at 2 days  Preliminary     Radiology Data:   Cardiology Data:    I have reviewed the medications.    Assessment/Plan     Assessment/Plan:  1.  Acute cholecystitis present on admission-General surgery has been consulted.  Patient is getting IV antibiotics in the form of Zosyn.    2.  Sepsis secondary to #1 present on admission-improving    3.  Acne pustule to left side of nose-we will order Bactroban ointment to apply twice daily.    4.  Acute renal failure present on admission-resolved    5.  Chronic hepatitis C    6.  IV drug abuse    7.  Chronic tobacco abuse-counseling        DVT prophylaxis: SCDs  Discharge Planning:   Lisa Talley, JUSTICE 06/03/20 08:46

## 2020-06-04 VITALS
HEIGHT: 70 IN | BODY MASS INDEX: 25.94 KG/M2 | TEMPERATURE: 98.1 F | SYSTOLIC BLOOD PRESSURE: 131 MMHG | RESPIRATION RATE: 16 BRPM | OXYGEN SATURATION: 100 % | WEIGHT: 181.22 LBS | HEART RATE: 64 BPM | DIASTOLIC BLOOD PRESSURE: 85 MMHG

## 2020-06-04 LAB
ALBUMIN SERPL-MCNC: 3.5 G/DL (ref 3.5–5.2)
ALBUMIN/GLOB SERPL: 1.5 G/DL
ALP SERPL-CCNC: 139 U/L (ref 39–117)
ALT SERPL W P-5'-P-CCNC: 76 U/L (ref 1–41)
ANION GAP SERPL CALCULATED.3IONS-SCNC: 11.8 MMOL/L (ref 5–15)
AST SERPL-CCNC: 35 U/L (ref 1–40)
BASOPHILS # BLD AUTO: 0.02 10*3/MM3 (ref 0–0.2)
BASOPHILS NFR BLD AUTO: 0.3 % (ref 0–1.5)
BILIRUB SERPL-MCNC: 1 MG/DL (ref 0.2–1.2)
BUN BLD-MCNC: 8 MG/DL (ref 6–20)
BUN/CREAT SERPL: 8.3 (ref 7–25)
CALCIUM SPEC-SCNC: 9.3 MG/DL (ref 8.6–10.5)
CHLORIDE SERPL-SCNC: 104 MMOL/L (ref 98–107)
CO2 SERPL-SCNC: 24.2 MMOL/L (ref 22–29)
CREAT BLD-MCNC: 0.96 MG/DL (ref 0.76–1.27)
DEPRECATED RDW RBC AUTO: 42.1 FL (ref 37–54)
EOSINOPHIL # BLD AUTO: 0.03 10*3/MM3 (ref 0–0.4)
EOSINOPHIL NFR BLD AUTO: 0.4 % (ref 0.3–6.2)
ERYTHROCYTE [DISTWIDTH] IN BLOOD BY AUTOMATED COUNT: 13.2 % (ref 12.3–15.4)
GFR SERPL CREATININE-BSD FRML MDRD: 89 ML/MIN/1.73
GLOBULIN UR ELPH-MCNC: 2.4 GM/DL
GLUCOSE BLD-MCNC: 159 MG/DL (ref 65–99)
HCT VFR BLD AUTO: 40.4 % (ref 37.5–51)
HGB BLD-MCNC: 13.8 G/DL (ref 13–17.7)
IMM GRANULOCYTES # BLD AUTO: 0.06 10*3/MM3 (ref 0–0.05)
IMM GRANULOCYTES NFR BLD AUTO: 0.8 % (ref 0–0.5)
LYMPHOCYTES # BLD AUTO: 1.34 10*3/MM3 (ref 0.7–3.1)
LYMPHOCYTES NFR BLD AUTO: 18.7 % (ref 19.6–45.3)
MCH RBC QN AUTO: 29.8 PG (ref 26.6–33)
MCHC RBC AUTO-ENTMCNC: 34.2 G/DL (ref 31.5–35.7)
MCV RBC AUTO: 87.3 FL (ref 79–97)
MONOCYTES # BLD AUTO: 0.32 10*3/MM3 (ref 0.1–0.9)
MONOCYTES NFR BLD AUTO: 4.5 % (ref 5–12)
NEUTROPHILS # BLD AUTO: 5.39 10*3/MM3 (ref 1.7–7)
NEUTROPHILS NFR BLD AUTO: 75.3 % (ref 42.7–76)
NRBC BLD AUTO-RTO: 0 /100 WBC (ref 0–0.2)
PLATELET # BLD AUTO: 161 10*3/MM3 (ref 140–450)
PMV BLD AUTO: 11.3 FL (ref 6–12)
POTASSIUM BLD-SCNC: 4.1 MMOL/L (ref 3.5–5.2)
PROT SERPL-MCNC: 5.9 G/DL (ref 6–8.5)
RBC # BLD AUTO: 4.63 10*6/MM3 (ref 4.14–5.8)
SODIUM BLD-SCNC: 140 MMOL/L (ref 136–145)
WBC NRBC COR # BLD: 7.16 10*3/MM3 (ref 3.4–10.8)

## 2020-06-04 PROCEDURE — 99239 HOSP IP/OBS DSCHRG MGMT >30: CPT | Performed by: NURSE PRACTITIONER

## 2020-06-04 PROCEDURE — 80053 COMPREHEN METABOLIC PANEL: CPT | Performed by: NURSE PRACTITIONER

## 2020-06-04 PROCEDURE — 85025 COMPLETE CBC W/AUTO DIFF WBC: CPT | Performed by: NURSE PRACTITIONER

## 2020-06-04 PROCEDURE — 99024 POSTOP FOLLOW-UP VISIT: CPT | Performed by: SURGERY

## 2020-06-04 PROCEDURE — 25010000002 PIPERACILLIN SOD-TAZOBACTAM PER 1 G: Performed by: SURGERY

## 2020-06-04 RX ORDER — BUPROPION HYDROCHLORIDE 150 MG/1
150 TABLET, EXTENDED RELEASE ORAL 2 TIMES DAILY
Qty: 6 TABLET | Refills: 0 | Status: SHIPPED | OUTPATIENT
Start: 2020-06-04 | End: 2020-06-07

## 2020-06-04 RX ORDER — ACETAMINOPHEN 325 MG/1
650 TABLET ORAL EVERY 6 HOURS PRN
Qty: 20 TABLET | Refills: 0 | Status: SHIPPED | OUTPATIENT
Start: 2020-06-04

## 2020-06-04 RX ADMIN — NICOTINE 1 PATCH: 21 PATCH TRANSDERMAL at 09:21

## 2020-06-04 RX ADMIN — OXYCODONE HYDROCHLORIDE AND ACETAMINOPHEN 1 TABLET: 5; 325 TABLET ORAL at 10:54

## 2020-06-04 RX ADMIN — OXYCODONE HYDROCHLORIDE AND ACETAMINOPHEN 1 TABLET: 5; 325 TABLET ORAL at 04:10

## 2020-06-04 RX ADMIN — TAZOBACTAM SODIUM AND PIPERACILLIN SODIUM 3.38 G: 375; 3 INJECTION, SOLUTION INTRAVENOUS at 04:12

## 2020-06-04 RX ADMIN — TAZOBACTAM SODIUM AND PIPERACILLIN SODIUM 3.38 G: 375; 3 INJECTION, SOLUTION INTRAVENOUS at 09:21

## 2020-06-04 RX ADMIN — Medication 1 CAPSULE: at 09:20

## 2020-06-04 RX ADMIN — MUPIROCIN: 20 OINTMENT TOPICAL at 09:23

## 2020-06-04 RX ADMIN — BUPROPION HYDROCHLORIDE 150 MG: 150 TABLET, EXTENDED RELEASE ORAL at 09:20

## 2020-06-04 RX ADMIN — ACETAMINOPHEN 650 MG: 325 TABLET, FILM COATED ORAL at 07:55

## 2020-06-04 NOTE — PROGRESS NOTES
LOS: 3 days   Patient Care Team:  Provider, No Known as PCP - General      Chief Complaint: feels markedly better      Interval History:     Patient Complaints: None    History taken from: patient RN    Vital Signs  Temp:  [97.5 °F (36.4 °C)-98.7 °F (37.1 °C)] 98.1 °F (36.7 °C)  Heart Rate:  [55-84] 64  Resp:  [16-21] 16  BP: (116-131)/(74-85) 131/85    Physical Exam:     General Appearance:    Alert, cooperative, in no acute distress   Head:    Normocephalic, without obvious abnormality, atraumatic   Lungs:     Clear to auscultation,respirations regular, even and                  unlabored    Heart:    Regular rhythm and normal rate, normal S1 and S2, no            murmur, no gallop, no rub, no click   Abdomen:     Normal bowel sounds, no masses, no organomegaly, soft        non-tender, non-distended, no guarding, no rebound                tenderness   Extremities:   Moves all extremities well, no edema, no cyanosis, no             redness   Pulses:   Pulses palpable and equal bilaterally   Skin:   No bleeding, bruising or rash        Results Review:       Lab Results (last 24 hours)     Procedure Component Value Units Date/Time    Comprehensive Metabolic Panel [[id number]]  (Abnormal) Collected:  06/04/20 0848    Specimen:  Blood Updated:  06/04/20 0920     Glucose 159 mg/dL      BUN 8 mg/dL      Creatinine 0.96 mg/dL      Sodium 140 mmol/L      Potassium 4.1 mmol/L      Chloride 104 mmol/L      CO2 24.2 mmol/L      Calcium 9.3 mg/dL      Total Protein 5.9 g/dL      Albumin 3.50 g/dL      ALT (SGPT) 76 U/L      AST (SGOT) 35 U/L      Alkaline Phosphatase 139 U/L      Total Bilirubin 1.0 mg/dL      eGFR Non African Amer 89 mL/min/1.73      Globulin 2.4 gm/dL      A/G Ratio 1.5 g/dL      BUN/Creatinine Ratio 8.3     Anion Gap 11.8 mmol/L     Narrative:       GFR Normal >60  Chronic Kidney Disease <60  Kidney Failure <15      Tissue Pathology Exam [[id number]] Collected:  06/03/20 1636    Specimen:  Tissue from  Gallbladder Updated:  06/04/20 0911    CBC & Differential [[id number]] Collected:  06/04/20 0848    Specimen:  Blood Updated:  06/04/20 0900    Narrative:       The following orders were created for panel order CBC & Differential.  Procedure                               Abnormality         Status                     ---------                               -----------         ------                     CBC Auto Differential[[id number]]        Abnormal            Final result                 Please view results for these tests on the individual orders.    CBC Auto Differential [[id number]]  (Abnormal) Collected:  06/04/20 0848    Specimen:  Blood Updated:  06/04/20 0900     WBC 7.16 10*3/mm3      RBC 4.63 10*6/mm3      Hemoglobin 13.8 g/dL      Hematocrit 40.4 %      MCV 87.3 fL      MCH 29.8 pg      MCHC 34.2 g/dL      RDW 13.2 %      RDW-SD 42.1 fl      MPV 11.3 fL      Platelets 161 10*3/mm3      Neutrophil % 75.3 %      Lymphocyte % 18.7 %      Monocyte % 4.5 %      Eosinophil % 0.4 %      Basophil % 0.3 %      Immature Grans % 0.8 %      Neutrophils, Absolute 5.39 10*3/mm3      Lymphocytes, Absolute 1.34 10*3/mm3      Monocytes, Absolute 0.32 10*3/mm3      Eosinophils, Absolute 0.03 10*3/mm3      Basophils, Absolute 0.02 10*3/mm3      Immature Grans, Absolute 0.06 10*3/mm3      nRBC 0.0 /100 WBC     Blood Culture - Blood, Arm, Left [[id number]] Collected:  05/31/20 2218    Specimen:  Blood from Arm, Left Updated:  06/03/20 2231     Blood Culture No growth at 3 days    Blood Culture - Blood, Arm, Left [[id number]] Collected:  05/31/20 2206    Specimen:  Blood from Arm, Left Updated:  06/03/20 2231     Blood Culture No growth at 3 days    Hepatitis Panel, Acute [[id number]] Collected:  06/02/20 0549    Specimen:  Blood Updated:  06/03/20 1918    Narrative:       The following orders were created for panel order Hepatitis Panel, Acute.  Procedure                               Abnormality         Status                       ---------                               -----------         ------                     Hepatitis Panel, Acute[[id number]]       Abnormal            Final result               Hep B Confirmation Tube[[id number]]                          Final result                 Please view results for these tests on the individual orders.    Hepatitis Panel, Acute [[id number]]  (Abnormal) Collected:  06/02/20 0549    Specimen:  Blood Updated:  06/03/20 1918     Hepatitis B Surface Ag Non-Reactive     Hep A IgM Non-Reactive     Hep B C IgM Non-Reactive     Hepatitis C Ab Reactive    Narrative:       Results may be falsely decreased if patient taking Biotin.     COVID PRE-OP / PRE-PROCEDURE SCREENING ORDER (NO ISOLATION) - Swab, Nasopharynx [[id number]] Collected:  06/03/20 1319    Specimen:  Swab from Nasopharynx Updated:  06/03/20 1733    Narrative:       The following orders were created for panel order COVID PRE-OP / PRE-PROCEDURE SCREENING ORDER (NO ISOLATION) - Swab, Nasopharynx.  Procedure                               Abnormality         Status                     ---------                               -----------         ------                     COVID-19,CEPHEID,TONY IN-...[[id number]]  Normal              Final result                 Please view results for these tests on the individual orders.    COVID-19,CEPHEID,TONY IN-HOUSE(OR EMERGENT/ADD-ON),NP SWAB IN TRANSPORT MEDIA 3-4 HR TAT - Swab, Nasopharynx [[id number]]  (Normal) Collected:  06/03/20 1319    Specimen:  Swab from Nasopharynx Updated:  06/03/20 1733     COVID19 Not Detected    Hep B Confirmation Tube [[id number]] Collected:  06/03/20 0523    Specimen:  Blood Updated:  06/03/20 1017     Extra Tube HOLD              Assessment/Plan       Sepsis (CMS/HCC)    Acute renal failure (ARF) (CMS/HCC)    Right upper quadrant abdominal pain    Chronic hepatitis C without hepatic coma (CMS/HCC)    Tobacco dependence due to cigarettes    IV drug abuse (CMS/HCC)      Stable male  doing well after lap boogie for acute cholecystitis.  I did discuss the situation with the patient today and the hospitalist service, he is not able to get any narcotics for home due to heroin  Addiction.  He can be discharge and followup with me in 2 weeks.      George Day MD  06/04/20  09:45

## 2020-06-04 NOTE — DISCHARGE SUMMARY
[facility]   DISCHARGE SUMMARY    Name:  Triston Villanueva   Age:  34 y.o.  Sex:  male  :  1985  MRN:  [id number]   Visit Number:  [id number]  Primary Care Physician:  Provider, No Known  Date of Discharge:  2020  Admission Date:  2020      Presenting Problem:    Sepsis, due to unspecified organism, unspecified whether acute organ dysfunction present (CMS/HCC) [A41.9]       Discharge Diagnosis:       Sepsis (CMS/HCC)    Acute renal failure (ARF) (CMS/HCC)    Right upper quadrant abdominal pain    Chronic hepatitis C without hepatic coma (CMS/HCC)    Tobacco dependence due to cigarettes    IV drug abuse (CMS/HCC)        Past Medical History:  Past Medical History:   Diagnosis Date   • Hepatitis C          Consults:     Consults     Date and Time Order Name Status Description    6/3/2020 0842 Inpatient General Surgery Consult Completed           Procedures Performed:    Procedure(s):  CHOLECYSTECTOMY LAPAROSCOPIC INTRAOPERATIVE CHOLANGIOGRAPHY         History of presenting illness/Hospital Course:     This is a 34-year-old male with history of IV drug abuse, hepatitis C who was brought into the emergency room with complaints of abdominal pain nausea and vomiting.  According to the patient at that time he had used drugs 4 days prior and started feeling generally weak with abdominal pain nausea and vomiting.  He has a longstanding history of IV heroin abuse however he had been clean for about 1 month prior to relapsing.  Upon evaluation in the emergency room he was noted to have elevated creatinine 1.6 and elevated liver enzymes.  He does have a history of hepatitis C.  CT scan of the abdomen pelvis did show gallbladder wall thickening with pericholecystic fluid but no evidence of gallstones or biliary duct dilation.  This was followed up with a right upper quadrant ultrasound which showed nonspecific gallbladder wall thickening with borderline common duct dilation.  They felt  it could be further evaluated with an MRCP which did not show any evidence of bile duct stone but he did have marketed gallbladder wall thickening with pericholecystic fluid.  He has been on IV antibiotics in the form of Zosyn.    Dr. Day with general surgery was consulted for possible cholecystectomy.  He did see and evaluate the patient and he was taken to the OR on 6/3/2020 where he did undergo a laparoscopic cholecystectomy without any complications.  I have seen and evaluated patient at bedside this morning.  He denies any abdominal pain, nausea, vomiting.  He is anxious to be discharged home.  He has been cleared from the surgical standpoint for discharge.  He is to follow-up with Dr. Day to 2 weeks in office.  He is otherwise hemodynamically stable from the hospitalist standpoint for discharge home.        Vital Signs:    Temp:  [97.5 °F (36.4 °C)-98.7 °F (37.1 °C)] 98.1 °F (36.7 °C)  Heart Rate:  [55-84] 64  Resp:  [16-21] 16  BP: (116-131)/(74-85) 131/85    Physical Exam:  General Appearance:    Alert and cooperative, not in any acute distress.   Head:    Atraumatic and normocephalic, without obvious abnormality.   Eyes:            PERRLA, conjunctivae and sclerae normal, no Icterus. No pallor. Extra-occular movements are within normal limits.   Ears:    Ears appear intact with no abnormalities noted.   Throat:   No oral lesions, no thrush, oral mucosa moist.   Neck:   Supple, trachea midline, no thyromegaly, no carotid bruit.   Back:     No kyphoscoliosis present. No tenderness to palpation,   range of motion normal.   Lungs:     Chest shape is normal. Breath sounds heard bilaterally equally.  No crackles or wheezing. No Pleural rub or bronchial breathing.    Heart:    Normal S1 and S2, no murmur, no gallop, no rub. No JVD   Abdomen:     Normal bowel sounds,, laparscopic incision sites no masses, no organomegaly. Soft        non-tender, non-distended, no guarding, no rebound                 tenderness   Extremities:   Moves all extremities well, no edema, no cyanosis, no             clubbing   Pulses:   Pulses palpable and equal bilaterally   Skin:   No bleeding, bruising or rash   Lymph nodes:  Psychiatric/Behavior:    No palpable adenopathy    Normal mood, normal behavior   Neurologic:   Cranial nerves 2 - 12 grossly intact, sensation intact, Motor power is normal and equal bilaterally.           Pertinent Lab Results:     I have reviewed the labs done in the emergency room.  Results from last 7 days   Lab Units 06/04/20  0848 06/03/20  0524 06/02/20  0549 06/01/20  0524   SODIUM mmol/L 140 143 144 142   POTASSIUM mmol/L 4.1 4.0 4.0 4.0   CHLORIDE mmol/L 104 108* 110* 108*   CO2 mmol/L 24.2 24.4 22.3 21.2*   BUN mg/dL 8 9 13 33*   CREATININE mg/dL 0.96 1.02 1.01 1.58*   CALCIUM mg/dL 9.3 9.0 8.1* 6.9*   BILIRUBIN mg/dL 1.0  --  1.4* 1.8*   ALK PHOS U/L 139*  --  103 95   ALT (SGPT) U/L 76*  --  115* 144*   AST (SGOT) U/L 35  --  53* 76*   GLUCOSE mg/dL 159* 132* 108* 124*     Results from last 7 days   Lab Units 06/04/20  0848 06/03/20  0524 06/02/20  0549   WBC 10*3/mm3 7.16 5.17 13.88*   HEMOGLOBIN g/dL 13.8 12.1* 12.3*   HEMATOCRIT % 40.4 36.7* 36.8*   PLATELETS 10*3/mm3 161 111* 116*     Results from last 7 days   Lab Units 05/31/20  2107   INR  1.35*                 Results from last 7 days   Lab Units 06/01/20  0524   LIPASE U/L 34         Results from last 7 days   Lab Units 05/31/20 2103   COLOR UA  Dark Yellow*   GLUCOSE UA  Negative   KETONES UA  Negative   LEUKOCYTES UA  Trace*   PH, URINE  <=5.0   BILIRUBIN UA  Moderate (2+)*   UROBILINOGEN UA  1.0 E.U./dL     Pain Management Panel     Pain Management Panel Latest Ref Rng & Units 5/31/2020    AMPHETAMINES SCREEN, URINE Negative Negative    BARBITURATES SCREEN Negative Negative    BENZODIAZEPINE SCREEN, URINE Negative Negative    BUPRENORPHINEUR Negative Negative    COCAINE SCREEN, URINE Negative Negative    METHADONE SCREEN, URINE  Negative Negative    METHAMPHETAMINEUR Negative Negative        Results from last 7 days   Lab Units 05/31/20  2218 05/31/20  2206   BLOODCX  No growth at 3 days No growth at 3 days         Pertinent Radiology Results:    Imaging Results (All)     Procedure Component Value Units Date/Time    FL Cholangiogram Operative [[id number]] Collected:  06/04/20 0838     Updated:  06/04/20 0841    Narrative:       FLUOROSCOPIC ASSISTANCE     INDICATION: Cholecystectomy.     FINDINGS: Fluoroscopic assistance was provided to Dr. Day. Total  fluoroscopy time 30 seconds. 3 fluoroscopic cine loops were archived  demonstrating superimposition of surgical instruments. The visualized  portions of the opacified biliary ductal system demonstrate some mild  biliary ductal prominence. Contrast is seen in the duodenum as well as  the pancreatic duct. No definite persistent intraluminal filling defect  in the common duct. However, there does appear to be some questionable  subtle irregularity of the distal aspect of the common bile duct. This  may be artifactual. Other etiologies including cholangitis not excluded.  Please see Dr. Day's procedural notes for full details.       Impression:       Fluoroscopic assistance provided as described.     This report was finalized on 6/4/2020 8:39 AM by Jean Kincaid MD.    MRI abdomen wo contrast mrcp [[id number]] Collected:  06/02/20 1743     Updated:  06/02/20 1744    Narrative:       FINAL REPORT    CLINICAL HISTORY:  MRCP. Jaundice, abd pain, fever or biliary surgery or gallstones    FINDINGS:  Multiplanar MR imaging of the abdomen was performed without  contrast.  The exam is limited secondary to motion artifact.  Images of the liver reveal no evidence of mass. There is no  evidence of biliary ductal dilatation.  There is marked  gallbladder wall thickening with pericholecystic fluid.  There  is no MR evidence of gallstones.  There is no mass.  No abnormal  fluid collection is seen.  No  abnormal contrast enhancement is  seen on the postcontrast images.  There are small bilateral  pleural effusions.  There is a small amount of ascites.  There  are small retroperitoneal lymph nodes without definite  adenopathy.      Impression:       Limited exam due to motion artifact but no bile duct stone is  identified.  Marked gallbladder wall thickening with  pericholecystic fluid.  Small amount of ascites.    Authenticated by Darien Espinal III, MD on 06/02/2020  05:43:06 PM    US Liver [[id number]] Collected:  06/01/20 2019     Updated:  06/01/20 2020    Narrative:       FINAL REPORT    TECHNIQUE:  Ultrasound images of the right upper quadrant were obtained.    CLINICAL HISTORY:  R/O CHOLECYSTITIS    FINDINGS:  Limited images of the pancreas are obscured by bowel gas.  The  liver parenchyma is normal in echogenicity.  There is  nonspecific gallbladder wall thickening measuring up to 6 mm  without gallstones identified.  Common bile duct is borderline  dilated which could be further evaluated with MRCP.  There is a  small amount of fluid adjacent to the liver which may be  reactive.      Impression:       Nonspecific gallbladder wall thickening and borderline common  bile duct dilatation.  This could be further evaluated with  MRCP.  Small amount of free fluid adjacent to the liver which  may be reactive.    Authenticated by Darien Espinal III, MD on 06/01/2020  08:19:33 PM    CT Chest Without Contrast [[id number]] Collected:  05/31/20 2244     Updated:  05/31/20 2245    Narrative:       FINAL REPORT    TECHNIQUE:  Axial images were obtained from the lung apex to the mid abdomen  by computed tomography. [Coronal reformatted images were  obtained.]  This study was performed with techniques to keep  radiation doses as low as reasonably achievable, (ALARA).  Individualized dose reduction techniques using automated  exposure control or adjustment of mA and/or kV according to the  patient''s size were  employed.    CLINICAL HISTORY:  quest septic emboli    FINDINGS:  There is no axillary adenopathy. There is no hilar or  mediastinal adenopathy. Heart size is normal. There is no  pericardial or pleural effusion. Limited images of the upper  abdomen are unremarkable. No suspicious infiltrate or nodule is  identified.      Impression:       No acute process.    Authenticated by Darien Espinal III, MD on 05/31/2020  10:44:22 PM    CT Abdomen Pelvis With Contrast [[id number]] Collected:  05/31/20 2202     Updated:  05/31/20 2203    Narrative:       FINAL REPORT    CLINICAL HISTORY:  right side abdominal pain, nausea and vomiting    FINDINGS:  CT OF THE ABDOMEN AND PELVIS WITH CONTRAST  Axial CT images of  the abdomen and pelvis were obtained after the administration of  intravenous contrast. Coronal reformatted images were also  obtained and reviewed.This study was performed with techniques  to keep radiation doses as low as reasonably achievable (ALARA).  Individualized dose reduction techniques using automated  exposure control or adjustment of mA and/or kV according to the  patient''s size were employed.  Abdomen: The lung bases are  clear. The heart is normal in size.  The liver is fatty  infiltrated.  There is a probable small cyst in the right  hepatic lobe.  There is gallbladder wall thickening with  pericholecystic fluid.  There is no CT evidence of gallstones or  biliary ductal dilatation.  The spleen is unremarkable. No  adrenal mass is present. The pancreas has an unremarkable  appearance. The kidneys are normal, without evidence of mass or  hydronephrosis. The aorta is normal in caliber.    Pelvis: The  appendix is not well-visualized. The urinary bladder is  unremarkable. No inflammatory process is seen. There is no  evidence of mass or adenopathy. There is no evidence of bowel  obstruction.      Impression:       Gallbladder wall thickening with pericholecystic fluid but no  evidence of gallstones or  biliary ductal dilatation.  If  indicated, right upper quadrant ultrasound.    Authenticated by Darien Espinal III, MD on 05/31/2020  10:02:01 PM          Echo:         Condition on Discharge:    Stable        Discharge Disposition:    Home or Self Care    Discharge Medication:       Discharge Medications      New Medications      Instructions Start Date   acetaminophen 325 MG tablet  Commonly known as:  TYLENOL   650 mg, Oral, Every 6 Hours PRN      mupirocin 2 % ointment  Commonly known as:  BACTROBAN   Topical, Every 12 Hours Scheduled         Continue These Medications      Instructions Start Date   buprenorphine-naloxone 8-2 MG per SL tablet  Commonly known as:  SUBOXONE   2 tablets, Sublingual, Daily      buPROPion  MG 12 hr tablet  Commonly known as:  WELLBUTRIN SR   150 mg, Oral, 2 Times Daily      mirtazapine 15 MG tablet  Commonly known as:  REMERON   15 mg, Oral, Nightly PRN             Discharge Diet:     Diet Instructions     Diet: Regular; Thin      Discharge Diet:  Regular    Fluid Consistency:  Thin          Activity at Discharge:     Activity Instructions     Discharge Activity            Follow-up Appointments:    Future Appointments   Date Time Provider Department Center   6/17/2020  1:40 PM George Day MD [facility] None         Test Results Pending at Discharge:     Order Current Status    Tissue Pathology Exam In process    Blood Culture - Blood, Arm, Left Preliminary result    Blood Culture - Blood, Arm, Left Preliminary result             JUSTICE Radford  06/04/20  11:03    Time:  35  minutes were spent reviewing labs, history, evaluating patient and discharge planning.

## 2020-06-04 NOTE — PLAN OF CARE
Problem: Patient Care Overview  Goal: Plan of Care Review  Outcome: Ongoing (interventions implemented as appropriate)  Flowsheets (Taken 6/4/2020 8632)  Progress: improving  Plan of Care Reviewed With: patient  Outcome Summary: Patient required pain medication throughout the night. Walked around his room. good urine output. Vitals have been stable and will continue to monitor. One dressing has a scant amount of drainage on it, all the rest are clean, dry, and intact. no complaints.

## 2020-06-05 ENCOUNTER — READMISSION MANAGEMENT (OUTPATIENT)
Dept: CALL CENTER | Facility: HOSPITAL | Age: 35
End: 2020-06-05

## 2020-06-05 LAB
BACTERIA SPEC AEROBE CULT: NORMAL
BACTERIA SPEC AEROBE CULT: NORMAL

## 2020-06-05 NOTE — PROGRESS NOTES
Case Management Discharge Note      Final Note: home by car    Provided Post Acute Provider List?: N/A  N/A Provider List Comment: Has no HH, DME or Skilled Nursing needs.     Destination      No service has been selected for the patient.      Durable Medical Equipment      No service has been selected for the patient.      Dialysis/Infusion      No service has been selected for the patient.      Home Medical Care      No service has been selected for the patient.      Therapy      No service has been selected for the patient.      Community Resources      No service has been selected for the patient.        Transportation Services  Private: Car    Final Discharge Disposition Code: 01 - home or self-care

## 2020-06-05 NOTE — OUTREACH NOTE
Prep Survey      Responses   [facility] facility patient discharged from?  [facility]   Is LACE score < 7 ?  No   Eligibility  Not Eligible   What are the reasons patient is not eligible?  Other   Does the patient have one of the following disease processes/diagnoses(primary or secondary)?  General Surgery   Prep survey completed?  Yes          [place] Patel RN

## 2020-06-07 LAB
LAB AP CASE REPORT: NORMAL
PATH REPORT.FINAL DX SPEC: NORMAL

## 2020-06-17 ENCOUNTER — TELEPHONE (OUTPATIENT)
Dept: SURGERY | Facility: CLINIC | Age: 35
End: 2020-06-17

## 2022-09-30 ENCOUNTER — APPOINTMENT (OUTPATIENT)
Dept: PHARMACY | Facility: HOSPITAL | Age: 37
End: 2022-09-30

## 2022-10-28 ENCOUNTER — APPOINTMENT (OUTPATIENT)
Dept: PHARMACY | Facility: HOSPITAL | Age: 37
End: 2022-10-28

## 2023-10-25 ENCOUNTER — TRANSCRIBE ORDERS (OUTPATIENT)
Dept: GENERAL RADIOLOGY | Facility: HOSPITAL | Age: 38
End: 2023-10-25
Payer: COMMERCIAL

## 2023-10-25 ENCOUNTER — HOSPITAL ENCOUNTER (OUTPATIENT)
Dept: GENERAL RADIOLOGY | Facility: HOSPITAL | Age: 38
Discharge: HOME | End: 2023-10-25
Admitting: NURSE PRACTITIONER
Payer: COMMERCIAL

## 2023-10-25 DIAGNOSIS — G89.4 CHRONIC PAIN DISORDER: Primary | ICD-10-CM

## 2023-10-25 DIAGNOSIS — G89.4 CHRONIC PAIN DISORDER: ICD-10-CM

## 2023-10-25 PROCEDURE — 72070 X-RAY EXAM THORAC SPINE 2VWS: CPT

## 2023-10-25 PROCEDURE — 72040 X-RAY EXAM NECK SPINE 2-3 VW: CPT

## 2023-10-25 PROCEDURE — 72100 X-RAY EXAM L-S SPINE 2/3 VWS: CPT

## 2025-02-08 ENCOUNTER — HOSPITAL ENCOUNTER (EMERGENCY)
Facility: HOSPITAL | Age: 40
Discharge: HOME | DRG: 603 | End: 2025-02-08
Attending: EMERGENCY MEDICINE
Payer: COMMERCIAL

## 2025-02-08 VITALS
BODY MASS INDEX: 29.4 KG/M2 | HEIGHT: 71 IN | WEIGHT: 210 LBS | HEART RATE: 82 BPM | RESPIRATION RATE: 16 BRPM | DIASTOLIC BLOOD PRESSURE: 94 MMHG | SYSTOLIC BLOOD PRESSURE: 122 MMHG | OXYGEN SATURATION: 96 % | TEMPERATURE: 99.1 F

## 2025-02-08 DIAGNOSIS — L03.113 CELLULITIS OF RIGHT UPPER EXTREMITY: ICD-10-CM

## 2025-02-08 DIAGNOSIS — L02.91 ABSCESS: Primary | ICD-10-CM

## 2025-02-08 PROCEDURE — 99283 EMERGENCY DEPT VISIT LOW MDM: CPT | Performed by: EMERGENCY MEDICINE

## 2025-02-08 RX ORDER — SULFAMETHOXAZOLE AND TRIMETHOPRIM 800; 160 MG/1; MG/1
1 TABLET ORAL 2 TIMES DAILY
Qty: 14 TABLET | Refills: 0 | Status: SHIPPED | OUTPATIENT
Start: 2025-02-08 | End: 2025-02-13 | Stop reason: HOSPADM

## 2025-02-08 RX ORDER — SULFAMETHOXAZOLE AND TRIMETHOPRIM 800; 160 MG/1; MG/1
1 TABLET ORAL ONCE
Status: COMPLETED | OUTPATIENT
Start: 2025-02-08 | End: 2025-02-08

## 2025-02-08 RX ORDER — CEPHALEXIN 500 MG/1
1000 CAPSULE ORAL 2 TIMES DAILY
Qty: 28 CAPSULE | Refills: 0 | Status: SHIPPED | OUTPATIENT
Start: 2025-02-08 | End: 2025-02-13 | Stop reason: HOSPADM

## 2025-02-08 RX ADMIN — SULFAMETHOXAZOLE AND TRIMETHOPRIM 1 TABLET: 800; 160 TABLET ORAL at 04:18

## 2025-02-08 RX ADMIN — CEPHALEXIN 1000 MG: 250 CAPSULE ORAL at 04:18

## 2025-02-08 NOTE — DISCHARGE INSTRUCTIONS
As we discussed, incision and drainage of the abscess was recommended.  You have declined this.  It is important for you to apply warm compress to the affected area for 30 minutes 4 times a day.  You need to have a repeat examination in 48 hours to make sure that the area is improving.  You should return to the emergency department before then for fever, persistent or worsening swelling, persistent or worsening pain, concerning symptoms, worsening symptoms or new concerns.  You should also return to the emergency department if you change your mind regarding incision and drainage of the abscess.

## 2025-02-08 NOTE — ED PROVIDER NOTES
EMERGENCY DEPARTMENT ENCOUNTER    Pt Name: Triston Villanueva  MRN: [id number]  Pt :   1985  Room Number:    Date of encounter:  2025  PCP: Chela Bain APRN  ED Provider: Fer Billy MD    Historian: Patient      HPI:  Chief Complaint: Abscess        Context: Triston Villanueva is a 39 y.o. male who presents to the ED c/o abscess.  Patient has a past history significant for IV drug abuse.  Patient presents emergency department reporting that he injected into his right upper extremity and missed the vein.  He says he has had progressive worsening pain and swelling to the proximal medial right upper extremity for the past couple days.  Denies fever.      PAST MEDICAL HISTORY  Past Medical History:   Diagnosis Date    Hepatitis C          PAST SURGICAL HISTORY  Past Surgical History:   Procedure Laterality Date    CHOLECYSTECTOMY WITH INTRAOPERATIVE CHOLANGIOGRAM N/A 6/3/2020    Procedure: CHOLECYSTECTOMY LAPAROSCOPIC INTRAOPERATIVE CHOLANGIOGRAPHY;  Surgeon: George Day MD;  Location: [facility];  Service: General;  Laterality: N/A;         FAMILY HISTORY  No family history on file.      SOCIAL HISTORY  Social History     Socioeconomic History    Marital status:    Tobacco Use    Smoking status: Every Day     Current packs/day: 0.50     Types: Cigarettes   Substance and Sexual Activity    Alcohol use: Never    Drug use: Yes     Types: IV, Heroin     Comment: relapsed         ALLERGIES  Patient has no known allergies.        REVIEW OF SYSTEMS    All systems reviewed and negative except for those discussed in HPI.       PHYSICAL EXAM    I have reviewed the triage vital signs and nursing notes.    ED Triage Vitals [25 0252]   Temp Heart Rate Resp BP SpO2   99.1 °F (37.3 °C) 82 16 122/94 96 %      Temp src Heart Rate Source Patient Position BP Location FiO2 (%)   Oral Monitor Sitting Left arm --         General: no acute distress, well-appearing, non-toxic  Skin: Abscess with surrounding  cellulitis about the proximal medial right upper extremity.  Head: normocephalic, atraumatic  Eyes: Pupils equally round and reactive to light.  Nose: normal nasal mucosa, no visible deformity.  Mouth: moist mucous membranes.  Neck: supple.  Chest: no retractions, no visible deformity  Cardiovascular: Regular rate and rhythm.  Extremities: Abscess with surrounding cellulitis about the proximal medial right upper extremity.  Abscess measuring 3 x 3 cm.  Surrounding cellulitis is not circumferential about the right upper extremity. Abscess is fluctuant to palpation.  No crepitus throughout the right upper extremity.  Strong right radial pulse.  Capillary refill less than 2 seconds about the distal right upper extremity.  Intact range of motion of the right shoulder, elbow and wrist.  Neuro:  alert and oriented x3, no focal neurological deficits.  Psych:  appropriate mood and behavior.        LAB RESULTS  No results found for this or any previous visit (from the past 24 hours).    If labs were ordered, I independently reviewed the results and considered them in treating the patient.  See medical decision making discussion section for my interpretation of lab results.        RADIOLOGY  No Radiology Exams Resulted Within Past 24 Hours      PROCEDURES    Procedures    No orders to display       MEDICATIONS GIVEN IN ER    Medications   sulfamethoxazole-trimethoprim (BACTRIM DS,SEPTRA DS) 800-160 MG per tablet 1 tablet (has no administration in time range)   cephalexin (KEFLEX) capsule 1,000 mg (has no administration in time range)         MEDICAL DECISION MAKING, PROGRESS, and CONSULTS    All labs, if obtained, have been independently reviewed by me.  All radiology studies, if obtained, have been reviewed by me and the radiologist dictating the report.  All EKG's, if obtained, have been independently viewed and interpreted by me/my attending physician.      Discussion below represents my analysis of pertinent findings  related to patient's condition, differential diagnosis, treatment plan and final disposition.                         Differential diagnosis:    Differential clues cellulitis, abscess, necrotizing soft tissue infection, other acute emergency.    Medical Decision Making Discussion:    Vitals reviewed and demonstrate elevated oral temperature but otherwise are normal.    Clinically, patient has abscess about the proximal right upper extremity with surrounding cellulitis.  I recommended to the patient incision and drainage.  Patient has refused incision and drainage.  He is oriented to person, place and time and appears to have medical decision-making capacity.  I explained to the patient that I do not expect that it would improve with oral antibiotic alone.  Despite understanding this he still refuses incision and drainage.  As an alternative, he was instructed to apply warm compress, was prescribed oral antibiotics and was instructed to have a repeat exam in 48 hours and to return sooner if he changes his mind regarding incision and drainage or for any other concerning symptoms or new concerns    Additional sources:    - Chronic or social conditions impacting care: IV drug abuse    Shared Decision Making:  After my consideration of clinical presentation and any laboratory/radiology studies obtained, I discussed the findings with the patient/patient representative who is in agreement with the treatment plan and the final disposition.   Risks and benefits of discharge and/or observation/admission were discussed.    Orders placed during this visit:  No orders of the defined types were placed in this encounter.      AS OF 03:58 EST VITALS:    BP - 122/94  HR - 82  TEMP - 99.1 °F (37.3 °C) (Oral)  O2 SATS - 96%                  DIAGNOSIS  Final diagnoses:   Abscess   Cellulitis of right upper extremity         DISPOSITION  Discharge      Please note that portions of this document were completed with voice recognition  software.        Fer Billy MD  02/08/25 3722

## 2025-02-10 ENCOUNTER — HOSPITAL ENCOUNTER (INPATIENT)
Facility: HOSPITAL | Age: 40
LOS: 2 days | Discharge: HOME | DRG: 603 | End: 2025-02-13
Attending: STUDENT IN AN ORGANIZED HEALTH CARE EDUCATION/TRAINING PROGRAM | Admitting: FAMILY MEDICINE
Payer: COMMERCIAL

## 2025-02-10 ENCOUNTER — APPOINTMENT (OUTPATIENT)
Dept: CT IMAGING | Facility: HOSPITAL | Age: 40
DRG: 603 | End: 2025-02-10
Payer: COMMERCIAL

## 2025-02-10 DIAGNOSIS — L02.413 CUTANEOUS ABSCESS OF RIGHT UPPER EXTREMITY: Primary | ICD-10-CM

## 2025-02-10 PROBLEM — L02.91 ABSCESS: Status: ACTIVE | Noted: 2025-02-10

## 2025-02-10 LAB
ALBUMIN SERPL-MCNC: 4.1 G/DL (ref 3.5–5.2)
ALBUMIN/GLOB SERPL: 1.4 G/DL
ALP SERPL-CCNC: 87 U/L (ref 39–117)
ALT SERPL W P-5'-P-CCNC: 8 U/L (ref 1–41)
ANION GAP SERPL CALCULATED.3IONS-SCNC: 10.1 MMOL/L (ref 5–15)
AST SERPL-CCNC: 19 U/L (ref 1–40)
BASOPHILS # BLD AUTO: 0.05 10*3/MM3 (ref 0–0.2)
BASOPHILS NFR BLD AUTO: 0.8 % (ref 0–1.5)
BILIRUB SERPL-MCNC: 0.5 MG/DL (ref 0–1.2)
BUN SERPL-MCNC: 11 MG/DL (ref 6–20)
BUN/CREAT SERPL: 12.9 (ref 7–25)
CALCIUM SPEC-SCNC: 9 MG/DL (ref 8.6–10.5)
CHLORIDE SERPL-SCNC: 100 MMOL/L (ref 98–107)
CO2 SERPL-SCNC: 26.9 MMOL/L (ref 22–29)
CREAT SERPL-MCNC: 0.85 MG/DL (ref 0.76–1.27)
CRP SERPL-MCNC: 1.28 MG/DL (ref 0–0.5)
D-LACTATE SERPL-SCNC: 0.8 MMOL/L (ref 0.5–2)
DEPRECATED RDW RBC AUTO: 39.8 FL (ref 37–54)
EGFRCR SERPLBLD CKD-EPI 2021: 112 ML/MIN/1.73
EOSINOPHIL # BLD AUTO: 0.24 10*3/MM3 (ref 0–0.4)
EOSINOPHIL NFR BLD AUTO: 3.7 % (ref 0.3–6.2)
ERYTHROCYTE [DISTWIDTH] IN BLOOD BY AUTOMATED COUNT: 12.5 % (ref 12.3–15.4)
ERYTHROCYTE [SEDIMENTATION RATE] IN BLOOD: 21 MM/HR (ref 0–15)
GLOBULIN UR ELPH-MCNC: 3 GM/DL
GLUCOSE SERPL-MCNC: 109 MG/DL (ref 65–99)
HCT VFR BLD AUTO: 36.8 % (ref 37.5–51)
HGB BLD-MCNC: 12.2 G/DL (ref 13–17.7)
IMM GRANULOCYTES # BLD AUTO: 0.02 10*3/MM3 (ref 0–0.05)
IMM GRANULOCYTES NFR BLD AUTO: 0.3 % (ref 0–0.5)
LYMPHOCYTES # BLD AUTO: 1.94 10*3/MM3 (ref 0.7–3.1)
LYMPHOCYTES NFR BLD AUTO: 29.7 % (ref 19.6–45.3)
MCH RBC QN AUTO: 29 PG (ref 26.6–33)
MCHC RBC AUTO-ENTMCNC: 33.2 G/DL (ref 31.5–35.7)
MCV RBC AUTO: 87.4 FL (ref 79–97)
MONOCYTES # BLD AUTO: 0.7 10*3/MM3 (ref 0.1–0.9)
MONOCYTES NFR BLD AUTO: 10.7 % (ref 5–12)
NEUTROPHILS NFR BLD AUTO: 3.58 10*3/MM3 (ref 1.7–7)
NEUTROPHILS NFR BLD AUTO: 54.8 % (ref 42.7–76)
NRBC BLD AUTO-RTO: 0 /100 WBC (ref 0–0.2)
PLATELET # BLD AUTO: 272 10*3/MM3 (ref 140–450)
PMV BLD AUTO: 9.5 FL (ref 6–12)
POTASSIUM SERPL-SCNC: 4.8 MMOL/L (ref 3.5–5.2)
PROT SERPL-MCNC: 7.1 G/DL (ref 6–8.5)
RBC # BLD AUTO: 4.21 10*6/MM3 (ref 4.14–5.8)
SODIUM SERPL-SCNC: 137 MMOL/L (ref 136–145)
WBC NRBC COR # BLD AUTO: 6.53 10*3/MM3 (ref 3.4–10.8)

## 2025-02-10 PROCEDURE — 87040 BLOOD CULTURE FOR BACTERIA: CPT | Performed by: NURSE PRACTITIONER

## 2025-02-10 PROCEDURE — 87186 SC STD MICRODIL/AGAR DIL: CPT | Performed by: NURSE PRACTITIONER

## 2025-02-10 PROCEDURE — 25010000002 KETOROLAC TROMETHAMINE PER 15 MG: Performed by: FAMILY MEDICINE

## 2025-02-10 PROCEDURE — G0378 HOSPITAL OBSERVATION PER HR: HCPCS

## 2025-02-10 PROCEDURE — 99222 1ST HOSP IP/OBS MODERATE 55: CPT | Performed by: FAMILY MEDICINE

## 2025-02-10 PROCEDURE — 25010000002 LIDOCAINE 1 % SOLUTION: Performed by: NURSE PRACTITIONER

## 2025-02-10 PROCEDURE — 87205 SMEAR GRAM STAIN: CPT | Performed by: NURSE PRACTITIONER

## 2025-02-10 PROCEDURE — 80053 COMPREHEN METABOLIC PANEL: CPT | Performed by: NURSE PRACTITIONER

## 2025-02-10 PROCEDURE — 85025 COMPLETE CBC W/AUTO DIFF WBC: CPT | Performed by: NURSE PRACTITIONER

## 2025-02-10 PROCEDURE — 25010000002 PIPERACILLIN SOD-TAZOBACTAM PER 1 G: Performed by: NURSE PRACTITIONER

## 2025-02-10 PROCEDURE — 87077 CULTURE AEROBIC IDENTIFY: CPT | Performed by: NURSE PRACTITIONER

## 2025-02-10 PROCEDURE — 86140 C-REACTIVE PROTEIN: CPT | Performed by: NURSE PRACTITIONER

## 2025-02-10 PROCEDURE — 73201 CT UPPER EXTREMITY W/DYE: CPT

## 2025-02-10 PROCEDURE — 85652 RBC SED RATE AUTOMATED: CPT | Performed by: NURSE PRACTITIONER

## 2025-02-10 PROCEDURE — 99285 EMERGENCY DEPT VISIT HI MDM: CPT | Performed by: STUDENT IN AN ORGANIZED HEALTH CARE EDUCATION/TRAINING PROGRAM

## 2025-02-10 PROCEDURE — 99285 EMERGENCY DEPT VISIT HI MDM: CPT

## 2025-02-10 PROCEDURE — 25010000002 VANCOMYCIN 5 G RECONSTITUTED SOLUTION: Performed by: NURSE PRACTITIONER

## 2025-02-10 PROCEDURE — 25810000003 LACTATED RINGERS PER 1000 ML: Performed by: FAMILY MEDICINE

## 2025-02-10 PROCEDURE — 87147 CULTURE TYPE IMMUNOLOGIC: CPT | Performed by: NURSE PRACTITIONER

## 2025-02-10 PROCEDURE — 25010000002 PIPERACILLIN SOD-TAZOBACTAM PER 1 G: Performed by: FAMILY MEDICINE

## 2025-02-10 PROCEDURE — 25810000003 SODIUM CHLORIDE 0.9 % SOLUTION: Performed by: NURSE PRACTITIONER

## 2025-02-10 PROCEDURE — 83605 ASSAY OF LACTIC ACID: CPT | Performed by: NURSE PRACTITIONER

## 2025-02-10 PROCEDURE — 87070 CULTURE OTHR SPECIMN AEROBIC: CPT | Performed by: NURSE PRACTITIONER

## 2025-02-10 PROCEDURE — 25510000001 IOPAMIDOL 61 % SOLUTION: Performed by: STUDENT IN AN ORGANIZED HEALTH CARE EDUCATION/TRAINING PROGRAM

## 2025-02-10 RX ORDER — ACETAMINOPHEN 325 MG/1
650 TABLET ORAL EVERY 4 HOURS PRN
Status: DISCONTINUED | OUTPATIENT
Start: 2025-02-10 | End: 2025-02-13 | Stop reason: HOSPADM

## 2025-02-10 RX ORDER — ACETAMINOPHEN 160 MG/5ML
650 SOLUTION ORAL EVERY 4 HOURS PRN
Status: DISCONTINUED | OUTPATIENT
Start: 2025-02-10 | End: 2025-02-13 | Stop reason: HOSPADM

## 2025-02-10 RX ORDER — METHADONE HYDROCHLORIDE 10 MG/1
130 TABLET ORAL DAILY
Status: DISCONTINUED | OUTPATIENT
Start: 2025-02-11 | End: 2025-02-13 | Stop reason: HOSPADM

## 2025-02-10 RX ORDER — NICOTINE 21 MG/24HR
1 PATCH, TRANSDERMAL 24 HOURS TRANSDERMAL
Status: DISCONTINUED | OUTPATIENT
Start: 2025-02-10 | End: 2025-02-13 | Stop reason: HOSPADM

## 2025-02-10 RX ORDER — BISACODYL 5 MG/1
5 TABLET, DELAYED RELEASE ORAL DAILY PRN
Status: DISCONTINUED | OUTPATIENT
Start: 2025-02-10 | End: 2025-02-13 | Stop reason: HOSPADM

## 2025-02-10 RX ORDER — IOPAMIDOL 612 MG/ML
100 INJECTION, SOLUTION INTRAVASCULAR
Status: COMPLETED | OUTPATIENT
Start: 2025-02-10 | End: 2025-02-10

## 2025-02-10 RX ORDER — NITROGLYCERIN 0.4 MG/1
0.4 TABLET SUBLINGUAL
Status: DISCONTINUED | OUTPATIENT
Start: 2025-02-10 | End: 2025-02-13 | Stop reason: HOSPADM

## 2025-02-10 RX ORDER — SODIUM CHLORIDE 9 MG/ML
40 INJECTION, SOLUTION INTRAVENOUS AS NEEDED
Status: DISCONTINUED | OUTPATIENT
Start: 2025-02-10 | End: 2025-02-13 | Stop reason: HOSPADM

## 2025-02-10 RX ORDER — KETOROLAC TROMETHAMINE 30 MG/ML
15 INJECTION, SOLUTION INTRAMUSCULAR; INTRAVENOUS EVERY 6 HOURS PRN
Status: DISCONTINUED | OUTPATIENT
Start: 2025-02-10 | End: 2025-02-13 | Stop reason: HOSPADM

## 2025-02-10 RX ORDER — POLYETHYLENE GLYCOL 3350 17 G/17G
17 POWDER, FOR SOLUTION ORAL DAILY PRN
Status: DISCONTINUED | OUTPATIENT
Start: 2025-02-10 | End: 2025-02-13 | Stop reason: HOSPADM

## 2025-02-10 RX ORDER — VANCOMYCIN 2 GRAM/500 ML IN 0.9 % SODIUM CHLORIDE INTRAVENOUS
20 ONCE
Status: COMPLETED | OUTPATIENT
Start: 2025-02-10 | End: 2025-02-10

## 2025-02-10 RX ORDER — LIDOCAINE HYDROCHLORIDE 10 MG/ML
10 INJECTION, SOLUTION INFILTRATION; PERINEURAL ONCE
Status: COMPLETED | OUTPATIENT
Start: 2025-02-10 | End: 2025-02-10

## 2025-02-10 RX ORDER — SODIUM CHLORIDE 0.9 % (FLUSH) 0.9 %
10 SYRINGE (ML) INJECTION EVERY 12 HOURS SCHEDULED
Status: DISCONTINUED | OUTPATIENT
Start: 2025-02-10 | End: 2025-02-13 | Stop reason: HOSPADM

## 2025-02-10 RX ORDER — ONDANSETRON 2 MG/ML
4 INJECTION INTRAMUSCULAR; INTRAVENOUS EVERY 6 HOURS PRN
Status: DISCONTINUED | OUTPATIENT
Start: 2025-02-10 | End: 2025-02-13 | Stop reason: HOSPADM

## 2025-02-10 RX ORDER — SODIUM CHLORIDE, SODIUM LACTATE, POTASSIUM CHLORIDE, CALCIUM CHLORIDE 600; 310; 30; 20 MG/100ML; MG/100ML; MG/100ML; MG/100ML
125 INJECTION, SOLUTION INTRAVENOUS CONTINUOUS
Status: ACTIVE | OUTPATIENT
Start: 2025-02-10 | End: 2025-02-11

## 2025-02-10 RX ORDER — SODIUM CHLORIDE 0.9 % (FLUSH) 0.9 %
10 SYRINGE (ML) INJECTION AS NEEDED
Status: DISCONTINUED | OUTPATIENT
Start: 2025-02-10 | End: 2025-02-13 | Stop reason: HOSPADM

## 2025-02-10 RX ORDER — BISACODYL 10 MG
10 SUPPOSITORY, RECTAL RECTAL DAILY PRN
Status: DISCONTINUED | OUTPATIENT
Start: 2025-02-10 | End: 2025-02-13 | Stop reason: HOSPADM

## 2025-02-10 RX ORDER — SODIUM CHLORIDE 0.9 % (FLUSH) 0.9 %
10 SYRINGE (ML) INJECTION AS NEEDED
Status: DISCONTINUED | OUTPATIENT
Start: 2025-02-10 | End: 2025-02-11 | Stop reason: SDUPTHER

## 2025-02-10 RX ORDER — AMOXICILLIN 250 MG
2 CAPSULE ORAL 2 TIMES DAILY PRN
Status: DISCONTINUED | OUTPATIENT
Start: 2025-02-10 | End: 2025-02-13 | Stop reason: HOSPADM

## 2025-02-10 RX ORDER — ENOXAPARIN SODIUM 100 MG/ML
40 INJECTION SUBCUTANEOUS DAILY
Status: DISCONTINUED | OUTPATIENT
Start: 2025-02-10 | End: 2025-02-13 | Stop reason: HOSPADM

## 2025-02-10 RX ORDER — OXCARBAZEPINE 150 MG/1
150 TABLET, FILM COATED ORAL 2 TIMES DAILY
COMMUNITY

## 2025-02-10 RX ORDER — ACETAMINOPHEN 650 MG/1
650 SUPPOSITORY RECTAL EVERY 4 HOURS PRN
Status: DISCONTINUED | OUTPATIENT
Start: 2025-02-10 | End: 2025-02-13 | Stop reason: HOSPADM

## 2025-02-10 RX ADMIN — PIPERACILLIN SODIUM AND TAZOBACTAM SODIUM 3.38 G: 3; .375 INJECTION, POWDER, LYOPHILIZED, FOR SOLUTION INTRAVENOUS at 20:29

## 2025-02-10 RX ADMIN — SODIUM CHLORIDE, POTASSIUM CHLORIDE, SODIUM LACTATE AND CALCIUM CHLORIDE 125 ML/HR: 600; 310; 30; 20 INJECTION, SOLUTION INTRAVENOUS at 18:38

## 2025-02-10 RX ADMIN — NICOTINE 1 PATCH: 14 PATCH TRANSDERMAL at 17:27

## 2025-02-10 RX ADMIN — IOPAMIDOL 100 ML: 612 INJECTION, SOLUTION INTRAVENOUS at 14:45

## 2025-02-10 RX ADMIN — PIPERACILLIN AND TAZOBACTAM 3.38 G: 3; .375 INJECTION, POWDER, FOR SOLUTION INTRAVENOUS at 13:56

## 2025-02-10 RX ADMIN — VANCOMYCIN HYDROCHLORIDE 2000 MG: 5 INJECTION, POWDER, LYOPHILIZED, FOR SOLUTION INTRAVENOUS at 14:51

## 2025-02-10 RX ADMIN — LIDOCAINE HYDROCHLORIDE 10 ML: 10 INJECTION, SOLUTION INFILTRATION; PERINEURAL at 11:55

## 2025-02-10 RX ADMIN — KETOROLAC TROMETHAMINE 15 MG: 30 INJECTION, SOLUTION INTRAMUSCULAR; INTRAVENOUS at 18:39

## 2025-02-10 RX ADMIN — Medication 10 ML: at 20:40

## 2025-02-10 NOTE — ED PROVIDER NOTES
Pt Name: Triston Villanueva  MRN: [id number]  : 1985  Date of Encounter: 2/10/2025    PCP: Chela Bain APRN      Subjective    History of Present Illness:    Chief Complaint: Right upper arm pain    History of Present Illness: Triston Villanueva is a 39 y.o. male who presents to the ER complaining of right upper arm pain that started several days ago.  Patient was seen on 2025 was offered incision and drainage by ER physician of which patient refused.  Patient states wound opened up yesterday and has been draining nasty looking.  Patient states that the surrounding redness has increased.  Pain is described as Burning, Throbbing, and does not radiate  Patient rates pain as a 8 on a ten scale.    Triage Vitals:    ED Triage Vitals [02/10/25 1000]   Temp Heart Rate Resp BP SpO2   98.1 °F (36.7 °C) 86 18 140/86 99 %      Temp src Heart Rate Source Patient Position BP Location FiO2 (%)   Oral Monitor -- Left arm --       Nurses Notes reviewed and agree, including vitals, allergies, social history and prior medical history.     Patient has no known allergies.    Past Medical History:   Diagnosis Date    Hepatitis C        Past Surgical History:   Procedure Laterality Date    CHOLECYSTECTOMY WITH INTRAOPERATIVE CHOLANGIOGRAM N/A 6/3/2020    Procedure: CHOLECYSTECTOMY LAPAROSCOPIC INTRAOPERATIVE CHOLANGIOGRAPHY;  Surgeon: George Day MD;  Location: [facility];  Service: General;  Laterality: N/A;       Social History     Socioeconomic History    Marital status:    Tobacco Use    Smoking status: Every Day     Current packs/day: 0.50     Types: Cigarettes   Substance and Sexual Activity    Alcohol use: Never    Drug use: Yes     Types: IV, Heroin     Comment: relapsed       History reviewed. No pertinent family history.    REVIEW OF SYSTEMS:     All systems reviewed and not pertinent unless noted.    Review of Systems   Skin:  Positive for color change and wound.   All other systems reviewed and are  negative.      Objective    Physical Exam  Vitals and nursing note reviewed.   Constitutional:       Appearance: Normal appearance.   HENT:      Head: Normocephalic and atraumatic.   Eyes:      Extraocular Movements: Extraocular movements intact.      Pupils: Pupils are equal, round, and reactive to light.   Cardiovascular:      Rate and Rhythm: Normal rate and regular rhythm.      Pulses: Normal pulses.      Heart sounds: Normal heart sounds.   Pulmonary:      Effort: Pulmonary effort is normal.      Breath sounds: Normal breath sounds.   Musculoskeletal:         General: Normal range of motion.      Cervical back: Normal range of motion and neck supple.   Skin:     General: Skin is warm and dry.      Capillary Refill: Capillary refill takes less than 2 seconds.      Findings: Abscess, erythema and wound present.   Neurological:      Mental Status: He is alert.      GCS: GCS eye subscore is 4. GCS verbal subscore is 5. GCS motor subscore is 6.      Sensory: Sensation is intact.      Motor: Motor function is intact.   Psychiatric:         Attention and Perception: Attention and perception normal.         Mood and Affect: Mood and affect normal.         Speech: Speech normal.           LRINEC Score for Necrotizing Soft Tissue Infection - MDCalc  Calculated on Feb 10 2025 3:36 PM  0 points -> If high suspicion for necrotizing fasciitis through clinical history and physical exam, do not calculate a LRINEC score, and go straight to operative debridement. Consider IV antibiotics and serial labs to monitor response to treatment. Scores <6 were low risk -- but not no risk -- for necrotizing soft tissue infections.                    Wound Care    Date/Time: 2/10/2025 5:19 PM    Performed by: Morgan Back APRN  Authorized by: Raymond Tovar DO    Consent:     Consent obtained:  Verbal    Consent given by:  Patient    Risks, benefits, and alternatives were discussed: yes      Risks discussed:  Bleeding, infection and  pain  Universal protocol:     Procedure explained and questions answered to patient or proxy's satisfaction: yes      Relevant documents present and verified: yes      Test results available: yes      Site/side marked: yes      Immediately prior to procedure, a time out was called: yes      Patient identity confirmed:  Verbally with patient  Pre-procedure details:     Preparation: Patient was prepped and draped in usual sterile fashion    Anesthesia:     Anesthesia method:  Local infiltration    Local anesthetic:  Lidocaine 1% w/o epi  Procedure details:     Indications: open wounds    Post-procedure details:     Procedure completion:  Tolerated  Comments:      Wound washed out with 300 mL of normal saline.  Large quantity of purulent drainage noted on dressing material.      ED Course:    No orders to display       ED Course as of 02/10/25 1723   Mon Feb 10, 2025   1303 I have interviewed and examined the patient FACE-TO-FACE.  I reviewed the mid-level provider(s) note and agree with the clinical impression, plan, and disposition unless otherwise stated in the MDM below.    ATTENDING ATTESTATION  HPI: 39-year-old male presents with pain, redness, swelling and drainage from the right upper extremity x 9 days.  Seen in the ED 2 days ago.  Diagnosed with abscess.  Refused I&D.  Was discharged on antibiotics, but did not fill medications.    EXAM:   General: Alert.  Nontoxic appearance.  No acute distress.  Head: Normocephalic.  Atraumatic.  Eyes: No scleral icterus.  Respiratory: Nonlabored breathing.  GI: Abdomen is nondistended.  Neurologic: Oriented x 3.  No focal deficits.  Right upper extremity: Large area of induration to the right medial upper arm with open wound and active drainage.   Right hand exam: Strength 5/5 with thumb opposition, finger abduction, and wrist extension.  Sensation intact in the axillary, medial, radial, and ulnar nerve root distributions. Normal capillary refill.       [JS]      ED Course  User Index  [JS] Fredi Fowler DO       Orders placed during this visit:    Orders Placed This Encounter   Procedures    Wound Care    Wound Culture - Wound, Arm, Right    Blood Culture - Blood,    Blood Culture - Blood,    CT Upper Extremity Right With Contrast    Comprehensive Metabolic Panel    Sedimentation Rate    C-reactive Protein    Lactic Acid, Plasma    CBC Auto Differential    Wound Care    Supplies To Bedside - Notify MD When Ready- Camera    Wound Care    Insert Peripheral IV    Initiate Observation Status    CBC & Differential       LAB Results:    Lab Results (last 24 hours)       Procedure Component Value Units Date/Time    Wound Culture - Wound, Arm, Right [[id number]] Collected: 02/10/25 1222    Specimen: Wound from Arm, Right Updated: 02/10/25 1314     Gram Stain Moderate (3+) WBCs seen      Few (2+) Gram positive cocci in pairs and chains    Blood Culture - Blood, Hand, Right [[id number]] Collected: 02/10/25 1306    Specimen: Blood from Hand, Right Updated: 02/10/25 1307    Blood Culture - Blood, Arm, Left [[id number]] Collected: 02/10/25 1306    Specimen: Blood from Arm, Left Updated: 02/10/25 1307    CBC & Differential [[id number]]  (Abnormal) Collected: 02/10/25 1348    Specimen: Blood Updated: 02/10/25 1510    Narrative:      The following orders were created for panel order CBC & Differential.  Procedure                               Abnormality         Status                     ---------                               -----------         ------                     CBC Auto Differential[[id number]]        Abnormal            Final result               Scan Slide[[id number]]                                                                    Please view results for these tests on the individual orders.    Comprehensive Metabolic Panel [[id number]]  (Abnormal) Collected: 02/10/25 1348    Specimen: Blood Updated: 02/10/25 1426     Glucose 109 mg/dL      BUN 11 mg/dL      Creatinine 0.85 mg/dL       Sodium 137 mmol/L      Potassium 4.8 mmol/L      Comment: Specimen hemolyzed.  Result may be falsely elevated.        Chloride 100 mmol/L      CO2 26.9 mmol/L      Calcium 9.0 mg/dL      Total Protein 7.1 g/dL      Albumin 4.1 g/dL      ALT (SGPT) 8 U/L      Comment: Specimen hemolyzed.  Result may  be falsely elevated.        AST (SGOT) 19 U/L      Comment: Specimen hemolyzed.  Result may be falsely elevated.        Alkaline Phosphatase 87 U/L      Total Bilirubin 0.5 mg/dL      Globulin 3.0 gm/dL      A/G Ratio 1.4 g/dL      BUN/Creatinine Ratio 12.9     Anion Gap 10.1 mmol/L      eGFR 112.0 mL/min/1.73     Narrative:      GFR Categories in Chronic Kidney Disease (CKD)      GFR Category          GFR (mL/min/1.73)    Interpretation  G1                     90 or greater         Normal or high (1)  G2                      60-89                Mild decrease (1)  G3a                   45-59                Mild to moderate decrease  G3b                   30-44                Moderate to severe decrease  G4                    15-29                Severe decrease  G5                    14 or less           Kidney failure          (1)In the absence of evidence of kidney disease, neither GFR category G1 or G2 fulfill the criteria for CKD.    eGFR calculation 2021 CKD-EPI creatinine equation, which does not include race as a factor    C-reactive Protein [[id number]]  (Abnormal) Collected: 02/10/25 1348    Specimen: Blood Updated: 02/10/25 1426     C-Reactive Protein 1.28 mg/dL     Sedimentation Rate [[id number]]  (Abnormal) Collected: 02/10/25 1458    Specimen: Blood Updated: 02/10/25 1527     Sed Rate 21 mm/hr     Lactic Acid, Plasma [[id number]]  (Normal) Collected: 02/10/25 1458    Specimen: Blood Updated: 02/10/25 1522     Lactate 0.8 mmol/L     CBC Auto Differential [[id number]]  (Abnormal) Collected: 02/10/25 1458    Specimen: Blood Updated: 02/10/25 1510     WBC 6.53 10*3/mm3      RBC 4.21 10*6/mm3      Hemoglobin 12.2  g/dL      Hematocrit 36.8 %      MCV 87.4 fL      MCH 29.0 pg      MCHC 33.2 g/dL      RDW 12.5 %      RDW-SD 39.8 fl      MPV 9.5 fL      Platelets 272 10*3/mm3      Neutrophil % 54.8 %      Lymphocyte % 29.7 %      Monocyte % 10.7 %      Eosinophil % 3.7 %      Basophil % 0.8 %      Immature Grans % 0.3 %      Neutrophils, Absolute 3.58 10*3/mm3      Lymphocytes, Absolute 1.94 10*3/mm3      Monocytes, Absolute 0.70 10*3/mm3      Eosinophils, Absolute 0.24 10*3/mm3      Basophils, Absolute 0.05 10*3/mm3      Immature Grans, Absolute 0.02 10*3/mm3      nRBC 0.0 /100 WBC              If labs were ordered, I have independently reviewed the results and considered them in the diagnosis and treatment plan for the patient    RADIOLOGY    CT Upper Extremity Right With Contrast    Result Date: 2/10/2025  CT SCAN SIDE EXTREMITY.     2/10/2025 2:25 PM  HISTORY: Evaluation for abscess/osteomyelitis .  PROCEDURE: Axial images were obtained through the by computed tomography. Sagittal and coronal reconstruction images were performed. This study was performed with techniques to keep radiation doses as low as reasonably achievable, (ALARA). Individualized dose reduction techniques using automated exposure control or adjustment of mA and/or kV according to the patient size were employed.  COMPARISON: None.  FINDINGS: Bones: No acute fracture or malalignment.  Soft tissues: Mildly enlarged right axillary lymph nodes, likely reactive/inflammatory. In the anterior mid forearm there is focal skin thickening, soft tissue ulceration, and subcutaneous inflammatory fat stranding, consistent with cellulitis with ulceration. The ulcerated area is measuring approximately 1.5 cm (series 2, image 137).  No abnormal discrete fluid collections to suggest abscess. No muscular or osseous involvement is identified.  Mild medial and posterior distal forearm subcutaneous soft tissue swelling.      Impression: No evidence of osteomyelitis. No abscess.   Anterior mid forearm soft tissue ulceration and cellulitis. Medial and posterior distal arm/elbow edema/cellulitis.    Images personally reviewed, interpreted and dictated by EMBER Lambert.   CTDI: DLP: 457.9 mGy.cm   This study was performed with techniques to keep radiation doses as low as reasonably achievable (ALARA). Individualized dose reduction techniques using automated exposure control or adjustment of mA and/or kV according to the patient size were employed.      This report was signed and finalized on 2/10/2025 3:49 PM by EMBER Lambert.        If I have ordered, I have independently reviewed the above noted radiographic studies.  Please see the radiologist dictation for the official interpretation    Medications given to patient in the ER    Medications   sodium chloride 0.9 % flush 10 mL (has no administration in time range)   nicotine (NICODERM CQ) 14 MG/24HR patch 1 patch (has no administration in time range)   lidocaine (XYLOCAINE) 1 % injection 10 mL (10 mL Injection Given by Other 2/10/25 1155)   vancomycin IVPB 2000 mg in 0.9% Sodium Chloride 500 mL (0 mg Intravenous Stopped 2/10/25 1717)   piperacillin-tazobactam (ZOSYN) IVPB 3.375 g IVPB in 100 mL NS (VTB) (0 g Intravenous Stopped 2/10/25 1450)   iopamidol (ISOVUE-300) 61 % injection 100 mL (100 mL Intravenous Given 2/10/25 1445)       AS OF 17:23 EST VITALS:    BP - 113/72  HR - 66  TEMP - 98.1 °F (36.7 °C) (Oral)  O2 SATS - 100%         Shared Decision Making: After my consideration of the clinical presentation and laboratory/radiology studies obtained, I have discussed the findings with the patient/patient representative who is in agreement with the treatment plan and final disposition. Risks and benefits of discharge and/or observation admission were discussed.  Final disposition of the patient will be admitted to the hospital.  Patient is requested to follow-up with primary care provider and specialist in 1 week following  final discharge.      Medical Decision Making  Triston Villanueva is a 39 y.o. male who presents to the ER complaining of right upper arm pain that started several days ago.  Patient was seen on 2-8-2025 was offered incision and drainage by ER physician of which patient refused.  Patient states wound opened up yesterday and has been draining nasty looking.  Patient states that the surrounding redness has increased.  Pain is described as Burning, Throbbing, and does not radiate  Patient rates pain as a 8 on a ten scale.    DDX: includes but is not limited to: Abscess, cellulitis, necrotizing fasciitis, osteomyelitis, other    Problems Addressed:  Cutaneous abscess of right upper extremity: complicated acute illness or injury    Amount and/or Complexity of Data Reviewed  External Data Reviewed: labs, radiology, ECG and notes.     Details: I have personally reviewed labs, radiology EKG and notes from patient's chart  Labs: ordered. Decision-making details documented in ED Course.     Details: I have personally reviewed and documented all results  Radiology: ordered.     Details: I have personally reviewed and documented all results  Discussion of management or test interpretation with external provider(s): Discussed assessment, treatment and plan with ER attending, general surgery on-call, hospitalist    Risk  OTC drugs.  Prescription drug management.  Decision regarding hospitalization.  Risk Details: I have discussed with patient the finding of the test preformed today. Patient has been diagnosed with cutaneous abscess of the right upper extremity and will be admitted to the hospital.             Final diagnoses:   Cutaneous abscess of right upper extremity       Please note that portions of this document were completed using voice recognition dictation software.       Morgan Back APRN  02/10/25 1723       Morgan Back APRN  02/10/25 1724

## 2025-02-10 NOTE — PROGRESS NOTES
"Pharmacy Consult-Vancomycin Dosing    Triston Villanueva is a  39 y.o. male receiving vancomycin therapy.     Indication: RUE Abscess  Consulting Provider: Dr. Tovar    Goal AUC: 400-600 mg/:L*hr    Current Antimicrobial Therapy  Anti-Infectives (From admission, onward)      Ordered     Dose/Rate Route Frequency Start Stop    02/10/25 1835  piperacillin-tazobactam (ZOSYN) IVPB 3.375 g IVPB in 100 mL NS (VTB)        Ordering Provider: Raymond Tovar DO    3.375 g  over 4 Hours Intravenous Every 8 Hours 02/10/25 2000 02/15/25 1959    02/10/25 1229  vancomycin IVPB 2000 mg in 0.9% Sodium Chloride 500 mL        Ordering Provider: Morgan Back APRN    20 mg/kg × 95.3 kg  250 mL/hr over 120 Minutes Intravenous Once 02/10/25 1245 02/10/25 1717    02/10/25 1229  piperacillin-tazobactam (ZOSYN) IVPB 3.375 g IVPB in 100 mL NS (VTB)        Ordering Provider: Morgan Back APRN    3.375 g  over 30 Minutes Intravenous Once 02/10/25 1245 02/10/25 1450            Labs  Results from last 7 days   Lab Units 02/10/25  1458 02/10/25  1348   WBC 10*3/mm3 6.53  --    CREATININE mg/dL  --  0.85      Estimated Creatinine Clearance: 134.8 mL/min (by C-G formula based on SCr of 0.85 mg/dL).  Temp Readings from Last 1 Encounters:   02/10/25 98.1 °F (36.7 °C) (Oral)       Microbiology Culture results  Microbiology Results (last 10 days)       Procedure Component Value - Date/Time    Wound Culture - Wound, Arm, Right [[id number]] Collected: 02/10/25 1222    Lab Status: Preliminary result Specimen: Wound from Arm, Right Updated: 02/10/25 1314     Gram Stain Moderate (3+) WBCs seen      Few (2+) Gram positive cocci in pairs and chains            Evaluation of Dosing     Last Dose Received in the ED/Outside Facility: Vancomycin 2000mg  Is Patient on Dialysis or Renal Replacement: No    Ht - 180.3 cm (71\")  Wt - 95.3 kg (210 lb)    Evaluation of Level                      [facility] AUC Calculation    Current AUC:   320 mg/L*hr    Predicted " Steady State AUC on Current Dose: 553 mg/L*hr  _________________________________    Predicted Steady State AUC on New Dose:   New Start    Assessment/Plan    Pharmacy to dose vancomycin for RUE Abscess. Goal -600 mg/L*hr.  Patient received loading dose of vancomycin 2000 mg (~21.0 mg/kg) IV on 2/10 @ 1451. Initiate maintenance dose of vancomycin 1500 mg (~15.7 mg/kg) IV Q12hr on 2/10 @ 2300.  Assess clearance by vancomycin random level on 2/12 @ 0600.  Pharmacy will continue to monitor renal function, cultures and sensitivities, and clinical status to adjust regimen as necessary.      Thank you,  Pili Lopez, PharmD  02/10/25 18:35 EST

## 2025-02-10 NOTE — PROGRESS NOTES
Pharmacokinetic Consult - Zosyn Dosing  Triston Villanueva is a 39 y.o. male who has been consulted to dose Zosyn for  RUE Abscess .    Current Antimicrobial Therapy    Anti-Infectives (From admission, onward)      Ordered     Dose/Rate Route Frequency Start Stop    02/10/25 1229  vancomycin IVPB 2000 mg in 0.9% Sodium Chloride 500 mL        Ordering Provider: Morgan Back APRN    20 mg/kg × 95.3 kg  250 mL/hr over 120 Minutes Intravenous Once 02/10/25 1245 02/10/25 1717    02/10/25 1229  piperacillin-tazobactam (ZOSYN) IVPB 3.375 g IVPB in 100 mL NS (VTB)        Ordering Provider: Morgan Back APRN    3.375 g  over 30 Minutes Intravenous Once 02/10/25 1245 02/10/25 1450            Microbiology Results (last 10 days)       Procedure Component Value - Date/Time    Wound Culture - Wound, Arm, Right [[id number]] Collected: 02/10/25 1222    Lab Status: Preliminary result Specimen: Wound from Arm, Right Updated: 02/10/25 1314     Gram Stain Moderate (3+) WBCs seen      Few (2+) Gram positive cocci in pairs and chains             Allergies  Patient has no known allergies.    Relevant clinical data and objective history reviewed:  Creatinine   Date Value Ref Range Status   02/10/2025 0.85 0.76 - 1.27 mg/dL Final     Estimated Creatinine Clearance: 134.8 mL/min (by C-G formula based on SCr of 0.85 mg/dL).  No intake/output data recorded.  Patient weight: 95.3 kg (210 lb)    Asessment/Plan  Initiate Zosyn 3.375g IV every 8 hours  Pharmacy will monitor Mr. Villanueva's renal function and clinical status and adjust the Zosyn dose and/or frequency as needed.    Thanks,   Pili Lopez, PharmD  2/10/2025  18:33 EST

## 2025-02-11 LAB
ANION GAP SERPL CALCULATED.3IONS-SCNC: 11 MMOL/L (ref 5–15)
BASOPHILS # BLD AUTO: 0.05 10*3/MM3 (ref 0–0.2)
BASOPHILS NFR BLD AUTO: 1 % (ref 0–1.5)
BUN SERPL-MCNC: 11 MG/DL (ref 6–20)
BUN/CREAT SERPL: 11 (ref 7–25)
CALCIUM SPEC-SCNC: 9.2 MG/DL (ref 8.6–10.5)
CHLORIDE SERPL-SCNC: 104 MMOL/L (ref 98–107)
CO2 SERPL-SCNC: 26 MMOL/L (ref 22–29)
CREAT SERPL-MCNC: 1 MG/DL (ref 0.76–1.27)
DEPRECATED RDW RBC AUTO: 40.4 FL (ref 37–54)
EGFRCR SERPLBLD CKD-EPI 2021: 97 ML/MIN/1.73
EOSINOPHIL # BLD AUTO: 0.16 10*3/MM3 (ref 0–0.4)
EOSINOPHIL NFR BLD AUTO: 3.2 % (ref 0.3–6.2)
ERYTHROCYTE [DISTWIDTH] IN BLOOD BY AUTOMATED COUNT: 12.3 % (ref 12.3–15.4)
GLUCOSE SERPL-MCNC: 97 MG/DL (ref 65–99)
HCT VFR BLD AUTO: 39.5 % (ref 37.5–51)
HGB BLD-MCNC: 13.2 G/DL (ref 13–17.7)
IMM GRANULOCYTES # BLD AUTO: 0.01 10*3/MM3 (ref 0–0.05)
IMM GRANULOCYTES NFR BLD AUTO: 0.2 % (ref 0–0.5)
LYMPHOCYTES # BLD AUTO: 1.52 10*3/MM3 (ref 0.7–3.1)
LYMPHOCYTES NFR BLD AUTO: 30.2 % (ref 19.6–45.3)
MAGNESIUM SERPL-MCNC: 2.3 MG/DL (ref 1.6–2.6)
MCH RBC QN AUTO: 29.6 PG (ref 26.6–33)
MCHC RBC AUTO-ENTMCNC: 33.4 G/DL (ref 31.5–35.7)
MCV RBC AUTO: 88.6 FL (ref 79–97)
MONOCYTES # BLD AUTO: 0.55 10*3/MM3 (ref 0.1–0.9)
MONOCYTES NFR BLD AUTO: 10.9 % (ref 5–12)
MRSA DNA SPEC QL NAA+PROBE: NORMAL
NEUTROPHILS NFR BLD AUTO: 2.74 10*3/MM3 (ref 1.7–7)
NEUTROPHILS NFR BLD AUTO: 54.5 % (ref 42.7–76)
NRBC BLD AUTO-RTO: 0 /100 WBC (ref 0–0.2)
PHOSPHATE SERPL-MCNC: 3.4 MG/DL (ref 2.5–4.5)
PLATELET # BLD AUTO: 298 10*3/MM3 (ref 140–450)
PMV BLD AUTO: 9.6 FL (ref 6–12)
POTASSIUM SERPL-SCNC: 4.6 MMOL/L (ref 3.5–5.2)
RBC # BLD AUTO: 4.46 10*6/MM3 (ref 4.14–5.8)
SODIUM SERPL-SCNC: 141 MMOL/L (ref 136–145)
WBC NRBC COR # BLD AUTO: 5.03 10*3/MM3 (ref 3.4–10.8)

## 2025-02-11 PROCEDURE — 96372 THER/PROPH/DIAG INJ SC/IM: CPT

## 2025-02-11 PROCEDURE — 87641 MR-STAPH DNA AMP PROBE: CPT | Performed by: FAMILY MEDICINE

## 2025-02-11 PROCEDURE — 99232 SBSQ HOSP IP/OBS MODERATE 35: CPT | Performed by: FAMILY MEDICINE

## 2025-02-11 PROCEDURE — 83735 ASSAY OF MAGNESIUM: CPT | Performed by: FAMILY MEDICINE

## 2025-02-11 PROCEDURE — 25010000002 ENOXAPARIN PER 10 MG: Performed by: FAMILY MEDICINE

## 2025-02-11 PROCEDURE — 85025 COMPLETE CBC W/AUTO DIFF WBC: CPT | Performed by: FAMILY MEDICINE

## 2025-02-11 PROCEDURE — 25810000003 SODIUM CHLORIDE 0.9 % SOLUTION 500 ML FLEX CONT: Performed by: FAMILY MEDICINE

## 2025-02-11 PROCEDURE — 80048 BASIC METABOLIC PNL TOTAL CA: CPT | Performed by: FAMILY MEDICINE

## 2025-02-11 PROCEDURE — 25010000002 PIPERACILLIN SOD-TAZOBACTAM PER 1 G: Performed by: FAMILY MEDICINE

## 2025-02-11 PROCEDURE — 25010000002 VANCOMYCIN 1.5 G RECONSTITUTED SOLUTION 1 EACH VIAL: Performed by: FAMILY MEDICINE

## 2025-02-11 PROCEDURE — 84100 ASSAY OF PHOSPHORUS: CPT | Performed by: FAMILY MEDICINE

## 2025-02-11 RX ADMIN — VANCOMYCIN HYDROCHLORIDE 1500 MG: 1.5 INJECTION, POWDER, LYOPHILIZED, FOR SOLUTION INTRAVENOUS at 10:42

## 2025-02-11 RX ADMIN — PIPERACILLIN SODIUM AND TAZOBACTAM SODIUM 3.38 G: 3; .375 INJECTION, POWDER, LYOPHILIZED, FOR SOLUTION INTRAVENOUS at 12:25

## 2025-02-11 RX ADMIN — METHADONE HYDROCHLORIDE 130 MG: 10 TABLET ORAL at 09:08

## 2025-02-11 RX ADMIN — PIPERACILLIN SODIUM AND TAZOBACTAM SODIUM 3.38 G: 3; .375 INJECTION, POWDER, LYOPHILIZED, FOR SOLUTION INTRAVENOUS at 20:00

## 2025-02-11 RX ADMIN — VANCOMYCIN HYDROCHLORIDE 1500 MG: 1.5 INJECTION, POWDER, LYOPHILIZED, FOR SOLUTION INTRAVENOUS at 00:07

## 2025-02-11 RX ADMIN — Medication 10 ML: at 09:32

## 2025-02-11 RX ADMIN — Medication 10 ML: at 20:09

## 2025-02-11 RX ADMIN — ACETAMINOPHEN 650 MG: 325 TABLET, FILM COATED ORAL at 02:39

## 2025-02-11 RX ADMIN — ENOXAPARIN SODIUM 40 MG: 100 INJECTION SUBCUTANEOUS at 09:08

## 2025-02-11 RX ADMIN — NICOTINE 1 PATCH: 14 PATCH TRANSDERMAL at 09:10

## 2025-02-11 RX ADMIN — PIPERACILLIN SODIUM AND TAZOBACTAM SODIUM 3.38 G: 3; .375 INJECTION, POWDER, LYOPHILIZED, FOR SOLUTION INTRAVENOUS at 03:24

## 2025-02-11 NOTE — H&P
[facility] HOSPITALIST   HISTORY AND PHYSICAL      Name:  Triston Villanueva   Age:  39 y.o.  Sex:  male  :  1985  MRN:  [id number]   Visit Number:  [id number]  Admission Date:  2/10/2025  Date Of Service:  02/10/25  Primary Care Physician:  Chela Bain APRN    Chief Complaint:     Upper arm pain    History Of Presenting Illness:      Patient is a 39-year-old male brought to the emergency department for evaluation of right upper arm pain.  Started several days ago.  Patient was previously seen in the emergency department 2 days prior, for similar complaints.  He was diagnosed with an abscess in the right upper extremity at that time.  Was offered incision and drainage.  Patient refused.  Patient was discharged home on oral medications.  Patient did not take oral medications.  Patient return to the ED after the wound had opened up and started draining.  He describes the pain as burning throbbing, localized.  Patient has a known history of polysubstance use disorder, states that he shot up fentanyl and amphetamine 2 days ago.  Reports that he has a bottle of methadone in his pocket, that he had gone from his methadone clinic.  Hospitalist services consulted for admission.    Review Of Systems:    All systems were reviewed and negative except as mentioned in history of presenting illness, assessment and plan.    Past Medical History: Patient  has a past medical history of Hepatitis C and Hypertension.    Past Surgical History: Patient  has a past surgical history that includes cholecystectomy with intraoperative cholangiogram (N/A, 6/3/2020).    Social History: Patient  reports that he has been smoking cigarettes. He has never used smokeless tobacco. He reports current drug use. Drugs: IV, Heroin, and Methamphetamines. He reports that he does not drink alcohol.    Family History:  Patient's family history has been reviewed and found to be noncontributory.     Allergies:      Patient has no  known allergies.    Home Medications:    Prior to Admission Medications       Prescriptions Last Dose Informant Patient Reported? Taking?    acetaminophen (TYLENOL) 325 MG tablet Past Week  No Yes    Take 2 tablets by mouth Every 6 (Six) Hours As Needed for Mild Pain .    METHADONE 50MG/ML ORAL CONC 2/10/2025  Yes Yes    Take 2.7 mL by mouth Daily.    OXcarbazepine (TRILEPTAL) 150 MG tablet Past Week  Yes Yes    Take 1 tablet by mouth 2 (Two) Times a Day.    cephalexin (KEFLEX) 500 MG capsule   No No    Take 2 capsules by mouth 2 (Two) Times a Day for 7 days.    sulfamethoxazole-trimethoprim (BACTRIM DS,SEPTRA DS) 800-160 MG per tablet   No No    Take 1 tablet by mouth 2 (Two) Times a Day for 7 days.          ED Medications:    Medications   sodium chloride 0.9 % flush 10 mL (has no administration in time range)   nicotine (NICODERM CQ) 14 MG/24HR patch 1 patch (1 patch Transdermal Medication Applied 2/10/25 1727)   sodium chloride 0.9 % flush 10 mL (10 mL Intravenous Given 2/10/25 2040)   sodium chloride 0.9 % flush 10 mL (has no administration in time range)   sodium chloride 0.9 % infusion 40 mL (has no administration in time range)   Enoxaparin Sodium (LOVENOX) syringe 40 mg (40 mg Subcutaneous Not Given 2/10/25 1844)   nitroglycerin (NITROSTAT) SL tablet 0.4 mg (has no administration in time range)   Potassium Replacement - Follow Nurse / BPA Driven Protocol (has no administration in time range)   Magnesium Cardiology Dose Replacement - Follow Nurse / BPA Driven Protocol (has no administration in time range)   Phosphorus Replacement - Follow Nurse / BPA Driven Protocol (has no administration in time range)   Calcium Replacement - Follow Nurse / BPA Driven Protocol (has no administration in time range)   lactated ringers infusion (125 mL/hr Intravenous New Bag 2/10/25 1838)   sennosides-docusate (PERICOLACE) 8.6-50 MG per tablet 2 tablet (has no administration in time range)     And   polyethylene glycol  "(MIRALAX) packet 17 g (has no administration in time range)     And   bisacodyl (DULCOLAX) EC tablet 5 mg (has no administration in time range)     And   bisacodyl (DULCOLAX) suppository 10 mg (has no administration in time range)   acetaminophen (TYLENOL) tablet 650 mg (has no administration in time range)     Or   acetaminophen (TYLENOL) 160 MG/5ML oral solution 650 mg (has no administration in time range)     Or   acetaminophen (TYLENOL) suppository 650 mg (has no administration in time range)   ondansetron (ZOFRAN) injection 4 mg (has no administration in time range)   ketorolac (TORADOL) injection 15 mg (15 mg Intravenous Given 2/10/25 1839)   Pharmacy Consult - Pharmacy to dose (has no administration in time range)   piperacillin-tazobactam (ZOSYN) IVPB 3.375 g IVPB in 100 mL NS (VTB) (3.375 g Intravenous New Bag 2/10/25 2029)   vancomycin (VANCOCIN) 1,500 mg in sodium chloride 0.9 % 500 mL IVPB (has no administration in time range)   lidocaine (XYLOCAINE) 1 % injection 10 mL (10 mL Injection Given by Other 2/10/25 1155)   vancomycin IVPB 2000 mg in 0.9% Sodium Chloride 500 mL (0 mg Intravenous Stopped 2/10/25 1717)   piperacillin-tazobactam (ZOSYN) IVPB 3.375 g IVPB in 100 mL NS (VTB) (0 g Intravenous Stopped 2/10/25 1450)   iopamidol (ISOVUE-300) 61 % injection 100 mL (100 mL Intravenous Given 2/10/25 1445)     Vital Signs:  Temp:  [97.8 °F (36.6 °C)-98.3 °F (36.8 °C)] 97.8 °F (36.6 °C)  Heart Rate:  [63-96] 89  Resp:  [17-18] 18  BP: (113-148)/(72-98) 144/89        02/10/25  1000 02/10/25  1937   Weight: 95.3 kg (210 lb) 98.3 kg (216 lb 11.4 oz)     Body mass index is 30.23 kg/m².    Physical Exam:     Most recent vital Signs: /89 (BP Location: Left arm, Patient Position: Lying)   Pulse 89   Temp 97.8 °F (36.6 °C) (Oral)   Resp 18   Ht 180.3 cm (71\")   Wt 98.3 kg (216 lb 11.4 oz)   SpO2 100%   BMI 30.23 kg/m²     Physical Exam  Constitutional: Awake, alert  Eyes: PERRLA, sclerae anicteric, no " conjunctival injection  HENT: NCAT, mucous membranes moist  Neck: Supple, no thyromegaly, no lymphadenopathy, trachea midline  Respiratory: Clear to auscultation bilaterally, nonlabored respirations   Cardiovascular: RRR, no murmurs, rubs, or gallops, palpable pedal pulses bilaterally  Gastrointestinal: Positive bowel sounds, soft, nontender, nondistended  Musculoskeletal: No bilateral ankle edema, no clubbing or cyanosis to extremities  Psychiatric: Appropriate affect, cooperative  Neurologic: Oriented x 3, strength symmetric in all extremities, Cranial Nerves grossly intact to confrontation, speech clear  Skin: No rashes.  Right upper extremity erythematous, active drainage of blood and purulent drainage, induration present.  Streaking towards his axilla present.    Laboratory data:    I have reviewed the labs done in the emergency room.    Results from last 7 days   Lab Units 02/10/25  1348   SODIUM mmol/L 137   POTASSIUM mmol/L 4.8   CHLORIDE mmol/L 100   CO2 mmol/L 26.9   BUN mg/dL 11   CREATININE mg/dL 0.85   CALCIUM mg/dL 9.0   BILIRUBIN mg/dL 0.5   ALK PHOS U/L 87   ALT (SGPT) U/L 8   AST (SGOT) U/L 19   GLUCOSE mg/dL 109*     Results from last 7 days   Lab Units 02/10/25  1458   WBC 10*3/mm3 6.53   HEMOGLOBIN g/dL 12.2*   HEMATOCRIT % 36.8*   PLATELETS 10*3/mm3 272     Radiology:    CT Upper Extremity Right With Contrast    Result Date: 2/10/2025  CT SCAN SIDE EXTREMITY.     2/10/2025 2:25 PM  HISTORY: Evaluation for abscess/osteomyelitis .  PROCEDURE: Axial images were obtained through the by computed tomography. Sagittal and coronal reconstruction images were performed. This study was performed with techniques to keep radiation doses as low as reasonably achievable, (ALARA). Individualized dose reduction techniques using automated exposure control or adjustment of mA and/or kV according to the patient size were employed.  COMPARISON: None.  FINDINGS: Bones: No acute fracture or malalignment.  Soft  tissues: Mildly enlarged right axillary lymph nodes, likely reactive/inflammatory. In the anterior mid forearm there is focal skin thickening, soft tissue ulceration, and subcutaneous inflammatory fat stranding, consistent with cellulitis with ulceration. The ulcerated area is measuring approximately 1.5 cm (series 2, image 137).  No abnormal discrete fluid collections to suggest abscess. No muscular or osseous involvement is identified.  Mild medial and posterior distal forearm subcutaneous soft tissue swelling.      No evidence of osteomyelitis. No abscess.  Anterior mid forearm soft tissue ulceration and cellulitis. Medial and posterior distal arm/elbow edema/cellulitis.    Images personally reviewed, interpreted and dictated by EMBER Lambert.   CTDI: DLP: 457.9 mGy.cm   This study was performed with techniques to keep radiation doses as low as reasonably achievable (ALARA). Individualized dose reduction techniques using automated exposure control or adjustment of mA and/or kV according to the patient size were employed.      This report was signed and finalized on 2/10/2025 3:49 PM by EMBER Lambert.       Assessment:    Right upper extremity abscess  Polysubstance use disorder  Hypertension    Plan:    Right upper extremity abscess  Surgery aware the patient, consulted in ED.  Appreciate their recommendations.  No plans for I&D at this time, however, will defer surgical management decision making to surgery.  Patient is not septic.  IV Zosyn, IV vancomycin.  Cultures obtained in ED, awaiting sensitivities.  Explained to the patient the seriousness of this issue, and that signing out AGAINST MEDICAL ADVICE or medical noncompliance during treatment of this issue likely could cost him at minimum his arm, at worst, his life.  Patient verbalized understanding.  Polysubstance use disorder  Resume methadone  Hypertension  Resume home medications as appropriate.    Risk Assessment: High  DVT Prophylaxis:  Lovenox  Code Status: Full code  Diet: Cardiac    Raymond Tovar, DO  02/10/25  22:29 EST    Dictated utilizing Dragon dictation.

## 2025-02-11 NOTE — PAYOR COMM NOTE
"TO:PASSPORT  FROM:CARLOS STEELE RN PHONE [phone]  INPT NOTIFICATION AND CLINICALS  TAX ID [id number] [facility] [id number] DX CODE:L02.91    Triston Villanueva (39 y.o. Male)       Date of Birth   1985    Social Security Number       Address   [address] [place], [place] [address]    Home Phone   [phone]    MRN   [id number]       [place]   None    Marital Status                               Admission Date   2/10/25    Admission Type   Emergency    Admitting Provider   Raymond Tovar DO    Attending Provider   Hermilo Farmer MD    Department, Room/Bed   [facility] TELEMETRY 4, 430/1       Discharge Date       Discharge Disposition       Discharge Destination                                 Attending Provider: Hermilo Farmer MD    Allergies: No Known Allergies    Isolation: None   Infection: None   Code Status: CPR    Ht: 180.3 cm (71\")   Wt: 98.3 kg (216 lb 11.4 oz)    Admission Cmt: None   Principal Problem: Abscess [L02.91]                   Active Insurance as of 2/10/2025       Primary Coverage       Payor Plan Insurance Group Employer/Plan Group    [employer] BY [employer] [id number]       Payor Plan Address Payor Plan Phone Number Payor Plan Fax Number Effective Dates    [address] [address]   2021 - None Entered    [facility] [phone]         Subscriber Name Subscriber Birth Date Member ID       TRISTON VILLANUEVA 1985 [id number]                     Emergency Contacts        (Rel.) Home Phone Work Phone Mobile Phone    BRYAN VILLANUEVA (Spouse) [phone] -- --    JAZMIN VILLANUEVA (Sister) [phone] -- --    KIYA VILLANUEVA (Mother) [phone] -- --                 History & Physical        Raymond Tovar DO at 02/10/25 2229            [facility] HOSPITALIST   HISTORY AND PHYSICAL      Name:  Triston Villanueva   Age:  39 y.o.  Sex:  male  :  1985  MRN:  [id number]   Visit Number:  " [id number]  Admission Date:  2/10/2025  Date Of Service:  02/10/25  Primary Care Physician:  Chela Bain APRN    Chief Complaint:     Upper arm pain    History Of Presenting Illness:      Patient is a 39-year-old male brought to the emergency department for evaluation of right upper arm pain.  Started several days ago.  Patient was previously seen in the emergency department 2 days prior, for similar complaints.  He was diagnosed with an abscess in the right upper extremity at that time.  Was offered incision and drainage.  Patient refused.  Patient was discharged home on oral medications.  Patient did not take oral medications.  Patient return to the ED after the wound had opened up and started draining.  He describes the pain as burning throbbing, localized.  Patient has a known history of polysubstance use disorder, states that he shot up fentanyl and amphetamine 2 days ago.  Reports that he has a bottle of methadone in his pocket, that he had gone from his methadone clinic.  Hospitalist services consulted for admission.    Review Of Systems:    All systems were reviewed and negative except as mentioned in history of presenting illness, assessment and plan.    Past Medical History: Patient  has a past medical history of Hepatitis C and Hypertension.    Past Surgical History: Patient  has a past surgical history that includes cholecystectomy with intraoperative cholangiogram (N/A, 6/3/2020).    Social History: Patient  reports that he has been smoking cigarettes. He has never used smokeless tobacco. He reports current drug use. Drugs: IV, Heroin, and Methamphetamines. He reports that he does not drink alcohol.    Family History:  Patient's family history has been reviewed and found to be noncontributory.     Allergies:      Patient has no known allergies.    Home Medications:    Prior to Admission Medications       Prescriptions Last Dose Informant Patient Reported? Taking?    acetaminophen (TYLENOL) 325 MG  tablet Past Week  No Yes    Take 2 tablets by mouth Every 6 (Six) Hours As Needed for Mild Pain .    METHADONE 50MG/ML ORAL CONC 2/10/2025  Yes Yes    Take 2.7 mL by mouth Daily.    OXcarbazepine (TRILEPTAL) 150 MG tablet Past Week  Yes Yes    Take 1 tablet by mouth 2 (Two) Times a Day.    cephalexin (KEFLEX) 500 MG capsule   No No    Take 2 capsules by mouth 2 (Two) Times a Day for 7 days.    sulfamethoxazole-trimethoprim (BACTRIM DS,SEPTRA DS) 800-160 MG per tablet   No No    Take 1 tablet by mouth 2 (Two) Times a Day for 7 days.          ED Medications:    Medications   sodium chloride 0.9 % flush 10 mL (has no administration in time range)   nicotine (NICODERM CQ) 14 MG/24HR patch 1 patch (1 patch Transdermal Medication Applied 2/10/25 1727)   sodium chloride 0.9 % flush 10 mL (10 mL Intravenous Given 2/10/25 2040)   sodium chloride 0.9 % flush 10 mL (has no administration in time range)   sodium chloride 0.9 % infusion 40 mL (has no administration in time range)   Enoxaparin Sodium (LOVENOX) syringe 40 mg (40 mg Subcutaneous Not Given 2/10/25 1844)   nitroglycerin (NITROSTAT) SL tablet 0.4 mg (has no administration in time range)   Potassium Replacement - Follow Nurse / BPA Driven Protocol (has no administration in time range)   Magnesium Cardiology Dose Replacement - Follow Nurse / BPA Driven Protocol (has no administration in time range)   Phosphorus Replacement - Follow Nurse / BPA Driven Protocol (has no administration in time range)   Calcium Replacement - Follow Nurse / BPA Driven Protocol (has no administration in time range)   lactated ringers infusion (125 mL/hr Intravenous New Bag 2/10/25 1838)   sennosides-docusate (PERICOLACE) 8.6-50 MG per tablet 2 tablet (has no administration in time range)     And   polyethylene glycol (MIRALAX) packet 17 g (has no administration in time range)     And   bisacodyl (DULCOLAX) EC tablet 5 mg (has no administration in time range)     And   bisacodyl (DULCOLAX)  "suppository 10 mg (has no administration in time range)   acetaminophen (TYLENOL) tablet 650 mg (has no administration in time range)     Or   acetaminophen (TYLENOL) 160 MG/5ML oral solution 650 mg (has no administration in time range)     Or   acetaminophen (TYLENOL) suppository 650 mg (has no administration in time range)   ondansetron (ZOFRAN) injection 4 mg (has no administration in time range)   ketorolac (TORADOL) injection 15 mg (15 mg Intravenous Given 2/10/25 1839)   Pharmacy Consult - Pharmacy to dose (has no administration in time range)   piperacillin-tazobactam (ZOSYN) IVPB 3.375 g IVPB in 100 mL NS (VTB) (3.375 g Intravenous New Bag 2/10/25 2029)   vancomycin (VANCOCIN) 1,500 mg in sodium chloride 0.9 % 500 mL IVPB (has no administration in time range)   lidocaine (XYLOCAINE) 1 % injection 10 mL (10 mL Injection Given by Other 2/10/25 1155)   vancomycin IVPB 2000 mg in 0.9% Sodium Chloride 500 mL (0 mg Intravenous Stopped 2/10/25 1717)   piperacillin-tazobactam (ZOSYN) IVPB 3.375 g IVPB in 100 mL NS (VTB) (0 g Intravenous Stopped 2/10/25 1450)   iopamidol (ISOVUE-300) 61 % injection 100 mL (100 mL Intravenous Given 2/10/25 1445)     Vital Signs:  Temp:  [97.8 °F (36.6 °C)-98.3 °F (36.8 °C)] 97.8 °F (36.6 °C)  Heart Rate:  [63-96] 89  Resp:  [17-18] 18  BP: (113-148)/(72-98) 144/89        02/10/25  1000 02/10/25  1937   Weight: 95.3 kg (210 lb) 98.3 kg (216 lb 11.4 oz)     Body mass index is 30.23 kg/m².    Physical Exam:     Most recent vital Signs: /89 (BP Location: Left arm, Patient Position: Lying)   Pulse 89   Temp 97.8 °F (36.6 °C) (Oral)   Resp 18   Ht 180.3 cm (71\")   Wt 98.3 kg (216 lb 11.4 oz)   SpO2 100%   BMI 30.23 kg/m²     Physical Exam  Constitutional: Awake, alert  Eyes: PERRLA, sclerae anicteric, no conjunctival injection  HENT: NCAT, mucous membranes moist  Neck: Supple, no thyromegaly, no lymphadenopathy, trachea midline  Respiratory: Clear to auscultation bilaterally, " nonlabored respirations   Cardiovascular: RRR, no murmurs, rubs, or gallops, palpable pedal pulses bilaterally  Gastrointestinal: Positive bowel sounds, soft, nontender, nondistended  Musculoskeletal: No bilateral ankle edema, no clubbing or cyanosis to extremities  Psychiatric: Appropriate affect, cooperative  Neurologic: Oriented x 3, strength symmetric in all extremities, Cranial Nerves grossly intact to confrontation, speech clear  Skin: No rashes.  Right upper extremity erythematous, active drainage of blood and purulent drainage, induration present.  Streaking towards his axilla present.    Laboratory data:    I have reviewed the labs done in the emergency room.    Results from last 7 days   Lab Units 02/10/25  1348   SODIUM mmol/L 137   POTASSIUM mmol/L 4.8   CHLORIDE mmol/L 100   CO2 mmol/L 26.9   BUN mg/dL 11   CREATININE mg/dL 0.85   CALCIUM mg/dL 9.0   BILIRUBIN mg/dL 0.5   ALK PHOS U/L 87   ALT (SGPT) U/L 8   AST (SGOT) U/L 19   GLUCOSE mg/dL 109*     Results from last 7 days   Lab Units 02/10/25  1458   WBC 10*3/mm3 6.53   HEMOGLOBIN g/dL 12.2*   HEMATOCRIT % 36.8*   PLATELETS 10*3/mm3 272     Radiology:    CT Upper Extremity Right With Contrast    Result Date: 2/10/2025  CT SCAN SIDE EXTREMITY.     2/10/2025 2:25 PM  HISTORY: Evaluation for abscess/osteomyelitis .  PROCEDURE: Axial images were obtained through the by computed tomography. Sagittal and coronal reconstruction images were performed. This study was performed with techniques to keep radiation doses as low as reasonably achievable, (ALARA). Individualized dose reduction techniques using automated exposure control or adjustment of mA and/or kV according to the patient size were employed.  COMPARISON: None.  FINDINGS: Bones: No acute fracture or malalignment.  Soft tissues: Mildly enlarged right axillary lymph nodes, likely reactive/inflammatory. In the anterior mid forearm there is focal skin thickening, soft tissue ulceration, and  subcutaneous inflammatory fat stranding, consistent with cellulitis with ulceration. The ulcerated area is measuring approximately 1.5 cm (series 2, image 137).  No abnormal discrete fluid collections to suggest abscess. No muscular or osseous involvement is identified.  Mild medial and posterior distal forearm subcutaneous soft tissue swelling.      No evidence of osteomyelitis. No abscess.  Anterior mid forearm soft tissue ulceration and cellulitis. Medial and posterior distal arm/elbow edema/cellulitis.    Images personally reviewed, interpreted and dictated by EMBER Lambert.   CTDI: DLP: 457.9 mGy.cm   This study was performed with techniques to keep radiation doses as low as reasonably achievable (ALARA). Individualized dose reduction techniques using automated exposure control or adjustment of mA and/or kV according to the patient size were employed.      This report was signed and finalized on 2/10/2025 3:49 PM by EMBER Lambert.       Assessment:    Right upper extremity abscess  Polysubstance use disorder  Hypertension    Plan:    Right upper extremity abscess  Surgery aware the patient, consulted in ED.  Appreciate their recommendations.  No plans for I&D at this time, however, will defer surgical management decision making to surgery.  Patient is not septic.  IV Zosyn, IV vancomycin.  Cultures obtained in ED, awaiting sensitivities.  Explained to the patient the seriousness of this issue, and that signing out AGAINST MEDICAL ADVICE or medical noncompliance during treatment of this issue likely could cost him at minimum his arm, at worst, his life.  Patient verbalized understanding.  Polysubstance use disorder  Resume methadone  Hypertension  Resume home medications as appropriate.    Risk Assessment: High  DVT Prophylaxis: Lovenox  Code Status: Full code  Diet: Cardiac    Raymond Tovar DO  02/10/25  22:29 EST    Dictated utilizing Dragon dictation.     Electronically signed by La  DO Raymond at 02/10/25 2234          Emergency Department Notes        Morgan Back APRN at 02/10/25 1135        Procedure Orders    1. Wound Care [[id number]] ordered by Morgan Back APRN                 Pt Name: Triston Villanueva  MRN: [id number]  : 1985  Date of Encounter: 2/10/2025    PCP: Chela Bain APRN      Subjective    History of Present Illness:    Chief Complaint: Right upper arm pain    History of Present Illness: Triston Villanueva is a 39 y.o. male who presents to the ER complaining of right upper arm pain that started several days ago.  Patient was seen on 2025 was offered incision and drainage by ER physician of which patient refused.  Patient states wound opened up yesterday and has been draining nasty looking.  Patient states that the surrounding redness has increased.  Pain is described as Burning, Throbbing, and does not radiate  Patient rates pain as a 8 on a ten scale.    Triage Vitals:    ED Triage Vitals [02/10/25 1000]   Temp Heart Rate Resp BP SpO2   98.1 °F (36.7 °C) 86 18 140/86 99 %      Temp src Heart Rate Source Patient Position BP Location FiO2 (%)   Oral Monitor -- Left arm --       Nurses Notes reviewed and agree, including vitals, allergies, social history and prior medical history.     Patient has no known allergies.    Past Medical History:   Diagnosis Date    Hepatitis C        Past Surgical History:   Procedure Laterality Date    CHOLECYSTECTOMY WITH INTRAOPERATIVE CHOLANGIOGRAM N/A 6/3/2020    Procedure: CHOLECYSTECTOMY LAPAROSCOPIC INTRAOPERATIVE CHOLANGIOGRAPHY;  Surgeon: George Day MD;  Location: [facility];  Service: General;  Laterality: N/A;       Social History     Socioeconomic History    Marital status:    Tobacco Use    Smoking status: Every Day     Current packs/day: 0.50     Types: Cigarettes   Substance and Sexual Activity    Alcohol use: Never    Drug use: Yes     Types: IV, Heroin     Comment: relapsed       History reviewed. No  pertinent family history.    REVIEW OF SYSTEMS:     All systems reviewed and not pertinent unless noted.    Review of Systems   Skin:  Positive for color change and wound.   All other systems reviewed and are negative.      Objective    Physical Exam  Vitals and nursing note reviewed.   Constitutional:       Appearance: Normal appearance.   HENT:      Head: Normocephalic and atraumatic.   Eyes:      Extraocular Movements: Extraocular movements intact.      Pupils: Pupils are equal, round, and reactive to light.   Cardiovascular:      Rate and Rhythm: Normal rate and regular rhythm.      Pulses: Normal pulses.      Heart sounds: Normal heart sounds.   Pulmonary:      Effort: Pulmonary effort is normal.      Breath sounds: Normal breath sounds.   Musculoskeletal:         General: Normal range of motion.      Cervical back: Normal range of motion and neck supple.   Skin:     General: Skin is warm and dry.      Capillary Refill: Capillary refill takes less than 2 seconds.      Findings: Abscess, erythema and wound present.   Neurological:      Mental Status: He is alert.      GCS: GCS eye subscore is 4. GCS verbal subscore is 5. GCS motor subscore is 6.      Sensory: Sensation is intact.      Motor: Motor function is intact.   Psychiatric:         Attention and Perception: Attention and perception normal.         Mood and Affect: Mood and affect normal.         Speech: Speech normal.           LRINEC Score for Necrotizing Soft Tissue Infection - MDCalc  Calculated on Feb 10 2025 3:36 PM  0 points -> If high suspicion for necrotizing fasciitis through clinical history and physical exam, do not calculate a LRINEC score, and go straight to operative debridement. Consider IV antibiotics and serial labs to monitor response to treatment. Scores <6 were low risk -- but not no risk -- for necrotizing soft tissue infections.                    Wound Care    Date/Time: 2/10/2025 5:19 PM    Performed by: Morgan Back  APRN  Authorized by: Raymond Tovar DO    Consent:     Consent obtained:  Verbal    Consent given by:  Patient    Risks, benefits, and alternatives were discussed: yes      Risks discussed:  Bleeding, infection and pain  Universal protocol:     Procedure explained and questions answered to patient or proxy's satisfaction: yes      Relevant documents present and verified: yes      Test results available: yes      Site/side marked: yes      Immediately prior to procedure, a time out was called: yes      Patient identity confirmed:  Verbally with patient  Pre-procedure details:     Preparation: Patient was prepped and draped in usual sterile fashion    Anesthesia:     Anesthesia method:  Local infiltration    Local anesthetic:  Lidocaine 1% w/o epi  Procedure details:     Indications: open wounds    Post-procedure details:     Procedure completion:  Tolerated  Comments:      Wound washed out with 300 mL of normal saline.  Large quantity of purulent drainage noted on dressing material.      ED Course:    No orders to display       ED Course as of 02/10/25 1723   Mon Feb 10, 2025   1303 I have interviewed and examined the patient FACE-TO-FACE.  I reviewed the mid-level provider(s) note and agree with the clinical impression, plan, and disposition unless otherwise stated in the MDM below.    ATTENDING ATTESTATION  HPI: 39-year-old male presents with pain, redness, swelling and drainage from the right upper extremity x 9 days.  Seen in the ED 2 days ago.  Diagnosed with abscess.  Refused I&D.  Was discharged on antibiotics, but did not fill medications.    EXAM:   General: Alert.  Nontoxic appearance.  No acute distress.  Head: Normocephalic.  Atraumatic.  Eyes: No scleral icterus.  Respiratory: Nonlabored breathing.  GI: Abdomen is nondistended.  Neurologic: Oriented x 3.  No focal deficits.  Right upper extremity: Large area of induration to the right medial upper arm with open wound and active drainage.   Right hand  exam: Strength 5/5 with thumb opposition, finger abduction, and wrist extension.  Sensation intact in the axillary, medial, radial, and ulnar nerve root distributions. Normal capillary refill.       [JS]      ED Course User Index  [JS] Fredi Fowler DO       Orders placed during this visit:    Orders Placed This Encounter   Procedures    Wound Care    Wound Culture - Wound, Arm, Right    Blood Culture - Blood,    Blood Culture - Blood,    CT Upper Extremity Right With Contrast    Comprehensive Metabolic Panel    Sedimentation Rate    C-reactive Protein    Lactic Acid, Plasma    CBC Auto Differential    Wound Care    Supplies To Bedside - Notify MD When Ready- Camera    Wound Care    Insert Peripheral IV    Initiate Observation Status    CBC & Differential       LAB Results:    Lab Results (last 24 hours)       Procedure Component Value Units Date/Time    Wound Culture - Wound, Arm, Right [[id number]] Collected: 02/10/25 1222    Specimen: Wound from Arm, Right Updated: 02/10/25 1314     Gram Stain Moderate (3+) WBCs seen      Few (2+) Gram positive cocci in pairs and chains    Blood Culture - Blood, Hand, Right [[id number]] Collected: 02/10/25 1306    Specimen: Blood from Hand, Right Updated: 02/10/25 1307    Blood Culture - Blood, Arm, Left [[id number]] Collected: 02/10/25 1306    Specimen: Blood from Arm, Left Updated: 02/10/25 1307    CBC & Differential [[id number]]  (Abnormal) Collected: 02/10/25 1348    Specimen: Blood Updated: 02/10/25 1510    Narrative:      The following orders were created for panel order CBC & Differential.  Procedure                               Abnormality         Status                     ---------                               -----------         ------                     CBC Auto Differential[[id number]]        Abnormal            Final result               Scan Slide[[id number]]                                                                    Please view results for these tests on  the individual orders.    Comprehensive Metabolic Panel [[id number]]  (Abnormal) Collected: 02/10/25 1348    Specimen: Blood Updated: 02/10/25 1426     Glucose 109 mg/dL      BUN 11 mg/dL      Creatinine 0.85 mg/dL      Sodium 137 mmol/L      Potassium 4.8 mmol/L      Comment: Specimen hemolyzed.  Result may be falsely elevated.        Chloride 100 mmol/L      CO2 26.9 mmol/L      Calcium 9.0 mg/dL      Total Protein 7.1 g/dL      Albumin 4.1 g/dL      ALT (SGPT) 8 U/L      Comment: Specimen hemolyzed.  Result may  be falsely elevated.        AST (SGOT) 19 U/L      Comment: Specimen hemolyzed.  Result may be falsely elevated.        Alkaline Phosphatase 87 U/L      Total Bilirubin 0.5 mg/dL      Globulin 3.0 gm/dL      A/G Ratio 1.4 g/dL      BUN/Creatinine Ratio 12.9     Anion Gap 10.1 mmol/L      eGFR 112.0 mL/min/1.73     Narrative:      GFR Categories in Chronic Kidney Disease (CKD)      GFR Category          GFR (mL/min/1.73)    Interpretation  G1                     90 or greater         Normal or high (1)  G2                      60-89                Mild decrease (1)  G3a                   45-59                Mild to moderate decrease  G3b                   30-44                Moderate to severe decrease  G4                    15-29                Severe decrease  G5                    14 or less           Kidney failure          (1)In the absence of evidence of kidney disease, neither GFR category G1 or G2 fulfill the criteria for CKD.    eGFR calculation 2021 CKD-EPI creatinine equation, which does not include race as a factor    C-reactive Protein [[id number]]  (Abnormal) Collected: 02/10/25 1348    Specimen: Blood Updated: 02/10/25 1426     C-Reactive Protein 1.28 mg/dL     Sedimentation Rate [[id number]]  (Abnormal) Collected: 02/10/25 1458    Specimen: Blood Updated: 02/10/25 1527     Sed Rate 21 mm/hr     Lactic Acid, Plasma [[id number]]  (Normal) Collected: 02/10/25 1458    Specimen: Blood Updated:  02/10/25 1522     Lactate 0.8 mmol/L     CBC Auto Differential [[id number]]  (Abnormal) Collected: 02/10/25 1458    Specimen: Blood Updated: 02/10/25 1510     WBC 6.53 10*3/mm3      RBC 4.21 10*6/mm3      Hemoglobin 12.2 g/dL      Hematocrit 36.8 %      MCV 87.4 fL      MCH 29.0 pg      MCHC 33.2 g/dL      RDW 12.5 %      RDW-SD 39.8 fl      MPV 9.5 fL      Platelets 272 10*3/mm3      Neutrophil % 54.8 %      Lymphocyte % 29.7 %      Monocyte % 10.7 %      Eosinophil % 3.7 %      Basophil % 0.8 %      Immature Grans % 0.3 %      Neutrophils, Absolute 3.58 10*3/mm3      Lymphocytes, Absolute 1.94 10*3/mm3      Monocytes, Absolute 0.70 10*3/mm3      Eosinophils, Absolute 0.24 10*3/mm3      Basophils, Absolute 0.05 10*3/mm3      Immature Grans, Absolute 0.02 10*3/mm3      nRBC 0.0 /100 WBC              If labs were ordered, I have independently reviewed the results and considered them in the diagnosis and treatment plan for the patient    RADIOLOGY    CT Upper Extremity Right With Contrast    Result Date: 2/10/2025  CT SCAN SIDE EXTREMITY.     2/10/2025 2:25 PM  HISTORY: Evaluation for abscess/osteomyelitis .  PROCEDURE: Axial images were obtained through the by computed tomography. Sagittal and coronal reconstruction images were performed. This study was performed with techniques to keep radiation doses as low as reasonably achievable, (ALARA). Individualized dose reduction techniques using automated exposure control or adjustment of mA and/or kV according to the patient size were employed.  COMPARISON: None.  FINDINGS: Bones: No acute fracture or malalignment.  Soft tissues: Mildly enlarged right axillary lymph nodes, likely reactive/inflammatory. In the anterior mid forearm there is focal skin thickening, soft tissue ulceration, and subcutaneous inflammatory fat stranding, consistent with cellulitis with ulceration. The ulcerated area is measuring approximately 1.5 cm (series 2, image 137).  No abnormal discrete  fluid collections to suggest abscess. No muscular or osseous involvement is identified.  Mild medial and posterior distal forearm subcutaneous soft tissue swelling.      Impression: No evidence of osteomyelitis. No abscess.  Anterior mid forearm soft tissue ulceration and cellulitis. Medial and posterior distal arm/elbow edema/cellulitis.    Images personally reviewed, interpreted and dictated by EMBER Lambert.   CTDI: DLP: 457.9 mGy.cm   This study was performed with techniques to keep radiation doses as low as reasonably achievable (ALARA). Individualized dose reduction techniques using automated exposure control or adjustment of mA and/or kV according to the patient size were employed.      This report was signed and finalized on 2/10/2025 3:49 PM by EMBER Lambert.        If I have ordered, I have independently reviewed the above noted radiographic studies.  Please see the radiologist dictation for the official interpretation    Medications given to patient in the ER    Medications   sodium chloride 0.9 % flush 10 mL (has no administration in time range)   nicotine (NICODERM CQ) 14 MG/24HR patch 1 patch (has no administration in time range)   lidocaine (XYLOCAINE) 1 % injection 10 mL (10 mL Injection Given by Other 2/10/25 1155)   vancomycin IVPB 2000 mg in 0.9% Sodium Chloride 500 mL (0 mg Intravenous Stopped 2/10/25 1717)   piperacillin-tazobactam (ZOSYN) IVPB 3.375 g IVPB in 100 mL NS (VTB) (0 g Intravenous Stopped 2/10/25 1450)   iopamidol (ISOVUE-300) 61 % injection 100 mL (100 mL Intravenous Given 2/10/25 1445)       AS OF 17:23 EST VITALS:    BP - 113/72  HR - 66  TEMP - 98.1 °F (36.7 °C) (Oral)  O2 SATS - 100%         Shared Decision Making: After my consideration of the clinical presentation and laboratory/radiology studies obtained, I have discussed the findings with the patient/patient representative who is in agreement with the treatment plan and final disposition. Risks and benefits  of discharge and/or observation admission were discussed.  Final disposition of the patient will be admitted to the hospital.  Patient is requested to follow-up with primary care provider and specialist in 1 week following final discharge.      Medical Decision Making  Triston Villanueva is a 39 y.o. male who presents to the ER complaining of right upper arm pain that started several days ago.  Patient was seen on 2-8-2025 was offered incision and drainage by ER physician of which patient refused.  Patient states wound opened up yesterday and has been draining nasty looking.  Patient states that the surrounding redness has increased.  Pain is described as Burning, Throbbing, and does not radiate  Patient rates pain as a 8 on a ten scale.    DDX: includes but is not limited to: Abscess, cellulitis, necrotizing fasciitis, osteomyelitis, other    Problems Addressed:  Cutaneous abscess of right upper extremity: complicated acute illness or injury    Amount and/or Complexity of Data Reviewed  External Data Reviewed: labs, radiology, ECG and notes.     Details: I have personally reviewed labs, radiology EKG and notes from patient's chart  Labs: ordered. Decision-making details documented in ED Course.     Details: I have personally reviewed and documented all results  Radiology: ordered.     Details: I have personally reviewed and documented all results  Discussion of management or test interpretation with external provider(s): Discussed assessment, treatment and plan with ER attending, general surgery on-call, hospitalist    Risk  OTC drugs.  Prescription drug management.  Decision regarding hospitalization.  Risk Details: I have discussed with patient the finding of the test preformed today. Patient has been diagnosed with cutaneous abscess of the right upper extremity and will be admitted to the hospital.             Final diagnoses:   Cutaneous abscess of right upper extremity       Please note that portions of this  document were completed using voice recognition dictation software.       Morgan Back APRN  02/10/25 1723       Morgan Back APRN  02/10/25 1724      Electronically signed by Morgan Back APRN at 02/10/25 1724       Vital Signs (last day)       Date/Time Temp Temp src Pulse Resp BP Patient Position SpO2    02/11/25 0655 98.2 (36.8) Oral -- 18 140/91 Sitting --    02/11/25 0233 97.4 (36.3) Oral 87 18 160/87 Lying --    02/10/25 2211 97.8 (36.6) Oral 89 18 144/89 Lying 100    02/10/25 1937 98.3 (36.8) Oral 96 18 148/79 Lying 100    02/10/25 16:51:49 -- -- 66 -- 113/72 -- 100    02/10/25 1543 -- -- 72 -- 121/78 Lying 96    02/10/25 14:08:01 -- -- 63 17 135/98 Sitting 96    02/10/25 1000 98.1 (36.7) Oral 86 18 140/86 -- 99          Current Facility-Administered Medications   Medication Dose Route Frequency Provider Last Rate Last Admin    acetaminophen (TYLENOL) tablet 650 mg  650 mg Oral Q4H PRN Raymond Tovar DO   650 mg at 02/11/25 0239    Or    acetaminophen (TYLENOL) 160 MG/5ML oral solution 650 mg  650 mg Oral Q4H PRN Raymond Tovar DO        Or    acetaminophen (TYLENOL) suppository 650 mg  650 mg Rectal Q4H PRN Raymond Tovar DO        sennosides-docusate (PERICOLACE) 8.6-50 MG per tablet 2 tablet  2 tablet Oral BID PRN Raymond Tovar DO        And    polyethylene glycol (MIRALAX) packet 17 g  17 g Oral Daily PRN Raymond Tovar DO        And    bisacodyl (DULCOLAX) EC tablet 5 mg  5 mg Oral Daily PRN Raymond Tovar DO        And    bisacodyl (DULCOLAX) suppository 10 mg  10 mg Rectal Daily PRN Raymond Tovar DO        Calcium Replacement - Follow Nurse / BPA Driven Protocol   Not Applicable PRN Raymond Tovar DO        Enoxaparin Sodium (LOVENOX) syringe 40 mg  40 mg Subcutaneous Daily Raymond Tovar DO   40 mg at 02/11/25 0908    ketorolac (TORADOL) injection 15 mg  15 mg Intravenous Q6H PRN Raymond Tovar DO   15 mg at 02/10/25 1839    Magnesium Cardiology Dose Replacement - Follow Nurse / BPA  Driven Protocol   Not Applicable PRN Raymond Tovar DO        methadone (DOLOPHINE) tablet 130 mg  130 mg Oral Daily Raymond Tovar DO   130 mg at 02/11/25 0908    nicotine (NICODERM CQ) 14 MG/24HR patch 1 patch  1 patch Transdermal Q24H Morgan Back APRN   1 patch at 02/11/25 0910    nitroglycerin (NITROSTAT) SL tablet 0.4 mg  0.4 mg Sublingual Q5 Min PRN Raymond Tovar DO        ondansetron (ZOFRAN) injection 4 mg  4 mg Intravenous Q6H PRN Raymond Tovar DO        Pharmacy to dose vancomycin   Not Applicable Continuous PRN Raymond Tovar DO        Pharmacy to Dose Zosyn   Not Applicable Continuous PRN Raymond Tovar DO        Phosphorus Replacement - Follow Nurse / BPA Driven Protocol   Not Applicable PRN Raymond Tovar DO        piperacillin-tazobactam (ZOSYN) IVPB 3.375 g IVPB in 100 mL NS (VTB)  3.375 g Intravenous Q8H Raymond Tovar DO   3.375 g at 02/11/25 0324    Potassium Replacement - Follow Nurse / BPA Driven Protocol   Not Applicable PRN Raymond Tovar DO        sodium chloride 0.9 % flush 10 mL  10 mL Intravenous PRN Morgan Back, JUSTICE        sodium chloride 0.9 % flush 10 mL  10 mL Intravenous Q12H Raymond Tovar DO   10 mL at 02/11/25 0932    sodium chloride 0.9 % flush 10 mL  10 mL Intravenous PRN Raymond Tovar DO        sodium chloride 0.9 % infusion 40 mL  40 mL Intravenous PRN Raymond Tovar DO        vancomycin (VANCOCIN) 1,500 mg in sodium chloride 0.9 % 500 mL IVPB  1,500 mg Intravenous Q12H Raymond Tovar .3 mL/hr at 02/11/25 0007 1,500 mg at 02/11/25 0007     Lab Results (last 24 hours)       Procedure Component Value Units Date/Time    Wound Culture - Wound, Arm, Right [[id number]] Collected: 02/10/25 1222    Specimen: Wound from Arm, Right Updated: 02/11/25 0943     Wound Culture Growth present, too young to evaluate     Gram Stain Moderate (3+) WBCs seen      Few (2+) Gram positive cocci in pairs and chains    Sedimentation Rate [[id number]]  (Abnormal) Collected: 02/10/25  1458    Specimen: Blood Updated: 02/10/25 1527     Sed Rate 21 mm/hr     Lactic Acid, Plasma [[id number]]  (Normal) Collected: 02/10/25 1458    Specimen: Blood Updated: 02/10/25 1522     Lactate 0.8 mmol/L     CBC & Differential [[id number]]  (Abnormal) Collected: 02/10/25 1348    Specimen: Blood Updated: 02/10/25 1510    Narrative:      The following orders were created for panel order CBC & Differential.  Procedure                               Abnormality         Status                     ---------                               -----------         ------                     CBC Auto Differential[[id number]]        Abnormal            Final result               Scan Slide[[id number]]                                                                    Please view results for these tests on the individual orders.    CBC Auto Differential [[id number]]  (Abnormal) Collected: 02/10/25 1458    Specimen: Blood Updated: 02/10/25 1510     WBC 6.53 10*3/mm3      RBC 4.21 10*6/mm3      Hemoglobin 12.2 g/dL      Hematocrit 36.8 %      MCV 87.4 fL      MCH 29.0 pg      MCHC 33.2 g/dL      RDW 12.5 %      RDW-SD 39.8 fl      MPV 9.5 fL      Platelets 272 10*3/mm3      Neutrophil % 54.8 %      Lymphocyte % 29.7 %      Monocyte % 10.7 %      Eosinophil % 3.7 %      Basophil % 0.8 %      Immature Grans % 0.3 %      Neutrophils, Absolute 3.58 10*3/mm3      Lymphocytes, Absolute 1.94 10*3/mm3      Monocytes, Absolute 0.70 10*3/mm3      Eosinophils, Absolute 0.24 10*3/mm3      Basophils, Absolute 0.05 10*3/mm3      Immature Grans, Absolute 0.02 10*3/mm3      nRBC 0.0 /100 WBC     Comprehensive Metabolic Panel [[id number]]  (Abnormal) Collected: 02/10/25 1348    Specimen: Blood Updated: 02/10/25 1426     Glucose 109 mg/dL      BUN 11 mg/dL      Creatinine 0.85 mg/dL      Sodium 137 mmol/L      Potassium 4.8 mmol/L      Comment: Specimen hemolyzed.  Result may be falsely elevated.        Chloride 100 mmol/L      CO2 26.9 mmol/L      Calcium  9.0 mg/dL      Total Protein 7.1 g/dL      Albumin 4.1 g/dL      ALT (SGPT) 8 U/L      Comment: Specimen hemolyzed.  Result may  be falsely elevated.        AST (SGOT) 19 U/L      Comment: Specimen hemolyzed.  Result may be falsely elevated.        Alkaline Phosphatase 87 U/L      Total Bilirubin 0.5 mg/dL      Globulin 3.0 gm/dL      A/G Ratio 1.4 g/dL      BUN/Creatinine Ratio 12.9     Anion Gap 10.1 mmol/L      eGFR 112.0 mL/min/1.73     Narrative:      GFR Categories in Chronic Kidney Disease (CKD)      GFR Category          GFR (mL/min/1.73)    Interpretation  G1                     90 or greater         Normal or high (1)  G2                      60-89                Mild decrease (1)  G3a                   45-59                Mild to moderate decrease  G3b                   30-44                Moderate to severe decrease  G4                    15-29                Severe decrease  G5                    14 or less           Kidney failure          (1)In the absence of evidence of kidney disease, neither GFR category G1 or G2 fulfill the criteria for CKD.    eGFR calculation 2021 CKD-EPI creatinine equation, which does not include race as a factor    C-reactive Protein [[id number]]  (Abnormal) Collected: 02/10/25 1348    Specimen: Blood Updated: 02/10/25 1426     C-Reactive Protein 1.28 mg/dL     Blood Culture - Blood, Hand, Right [[id number]] Collected: 02/10/25 1306    Specimen: Blood from Hand, Right Updated: 02/10/25 1307    Blood Culture - Blood, Arm, Left [[id number]] Collected: 02/10/25 1306    Specimen: Blood from Arm, Left Updated: 02/10/25 1307          Imaging Results (Last 24 Hours)       Procedure Component Value Units Date/Time    CT Upper Extremity Right With Contrast [[id number]] Collected: 02/10/25 1544     Updated: 02/10/25 1551    Narrative:      CT SCAN SIDE EXTREMITY.     2/10/2025 2:25 PM      HISTORY:  Evaluation for abscess/osteomyelitis .     PROCEDURE:  Axial images were obtained through  the by computed tomography. Sagittal  and coronal reconstruction images were performed. This study was  performed with techniques to keep radiation doses as low as reasonably  achievable, (ALARA). Individualized dose reduction techniques using  automated exposure control or adjustment of mA and/or kV according to  the patient size were employed.     COMPARISON:  None.     FINDINGS:  Bones: No acute fracture or malalignment.     Soft tissues: Mildly enlarged right axillary lymph nodes, likely  reactive/inflammatory.  In the anterior mid forearm there is focal skin thickening, soft tissue  ulceration, and subcutaneous inflammatory fat stranding, consistent with  cellulitis with ulceration. The ulcerated area is measuring  approximately 1.5 cm (series 2, image 137).     No abnormal discrete fluid collections to suggest abscess. No muscular  or osseous involvement is identified.     Mild medial and posterior distal forearm subcutaneous soft tissue  swelling.       Impression:      No evidence of osteomyelitis. No abscess.     Anterior mid forearm soft tissue ulceration and cellulitis. Medial and  posterior distal arm/elbow edema/cellulitis.           Images personally reviewed, interpreted and dictated by EMBER Lambert.        CTDI:  DLP: 457.9 mGy.cm        This study was performed with techniques to keep radiation doses as low  as reasonably achievable (ALARA). Individualized dose reduction  techniques using automated exposure control or adjustment of mA and/or  kV according to the patient size were employed.                 This report was signed and finalized on 2/10/2025 3:49 PM by EMBER Lambert.             Physician Progress Notes (last 24 hours)  Notes from 02/10/25 1032 through 02/11/25 1032   No notes of this type exist for this encounter.       Consult Notes (last 24 hours)  Notes from 02/10/25 1032 through 02/11/25 1032   No notes of this type exist for this encounter.

## 2025-02-11 NOTE — CASE MANAGEMENT/SOCIAL WORK
Discharge Planning Assessment  [facility]     Patient Name: Triston Villanueva  MRN: [id number]  Today's Date: 2/11/2025    Admit Date: 2/10/2025    Plan: Home with family   Discharge Needs Assessment       Row Name 02/11/25 0954       Living Environment    People in Home parent(s)    Current Living Arrangements home    Potentially Unsafe Housing Conditions none    In the past 12 months has the electric, gas, oil, or water company threatened to shut off services in your home? No    Primary Care Provided by self    Provides Primary Care For no one    Able to Return to Prior Arrangements yes       Resource/Environmental Concerns    Resource/Environmental Concerns none    Transportation Concerns none       Transportation Needs    In the past 12 months, has lack of transportation kept you from medical appointments or from getting medications? no    In the past 12 months, has lack of transportation kept you from meetings, work, or from getting things needed for daily living? No       Food Insecurity    Within the past 12 months, you worried that your food would run out before you got the money to buy more. Never true    Within the past 12 months, the food you bought just didn't last and you didn't have money to get more. Never true       Transition Planning    Patient/Family Anticipates Transition to home with family    Patient/Family Anticipated Services at Transition none    Transportation Anticipated family or friend will provide       Discharge Needs Assessment    Readmission Within the Last 30 Days no previous admission in last 30 days    Equipment Currently Used at Home none    Concerns to be Addressed no discharge needs identified    Anticipated Changes Related to Illness none    Equipment Needed After Discharge none                   Discharge Plan       Row Name 02/11/25 0955       Plan    Plan Home with family    Patient/Family in Agreement with Plan yes    Plan Comments Met with patient at bedside.Verified  patient's address, phone number, contacts, physician and pharmacy. Patient has adequate transportation. Reports no financial or food insecurity. He lives with his mother. He does not use DME and drives. He uses [facility] pharmacy, agreeable to meds to bed. Patient plans to return home once medically ready, states no additional needs at this time.                  Continued Care and Services - Admitted Since 2/10/2025    No active coordination exists for this encounter.          Demographic Summary       Row Name 02/11/25 0953       General Information    Admission Type observation    Arrived From emergency department    Referral Source admission list    Reason for Consult discharge planning    Preferred Language English       Contact Information    Permission Granted to Share Info With                    Functional Status       Row Name 02/11/25 0953       Functional Status    Usual Activity Tolerance excellent    Current Activity Tolerance excellent       Physical Activity    On average, how many days per week do you engage in moderate to strenuous exercise (like a brisk walk)? 0 days    On average, how many minutes do you engage in exercise at this level? 0 min    Number of minutes of exercise per week 0       Assessment of Health Literacy    How often do you have someone help you read hospital materials? Never    How often do you have problems learning about your medical condition because of difficulty understanding written information? Never    How often do you have a problem understanding what is told to you about your medical condition? Never    How confident are you filling out medical forms by yourself? Extremely    Health Literacy Excellent       Functional Status, IADL    Medications independent    Meal Preparation independent    Housekeeping independent    Laundry independent    Shopping independent                   Psychosocial       Row Name 02/11/25 0953       Values/Beliefs    Spiritual,  Cultural Beliefs, [employer]beterian Practices, Values that Affect Care no       Mental Health    Little interest or pleasure in doing things Not at all    Feeling down, depressed, or hopeless Not at all       Stress    Do you feel stress - tense, restless, nervous, or anxious, or unable to sleep at night because your mind is troubled all the time - these days? Not at all       Coping/Stress    Major Change/Loss/Stressor illness    Patient Personal Strengths able to adapt;resilient    Techniques to [place] with Loss/Stress/Change diversional activities    Reaction to Health Status accepting    Understanding of Condition and Treatment adequate understanding of medical condition;adequate understanding of treatment       Developmental Stage (Eriksson's Stages of Development)    Developmental Stage Stage 7 (35-65 years/Middle Adulthood) Generativity vs. Stagnation                   Abuse/Neglect    No documentation.                  Legal    No documentation.                  Substance Abuse    No documentation.                  Patient Forms    No documentation.                     Monster Schafer RN

## 2025-02-11 NOTE — CONSULTS
General Surgery Consult     Name:Triston Villanueva  Age: 39 y.o.  Gender: male  : 1985  MRN: [id number]  Visit Number: [id number]  Admit Date: 2/10/2025  Date of Service: 25    Patient Care Team:  Chela Bani APRN as PCP - General (Nurse Practitioner)  George Day MD as Consulting Physician (General Surgery)    Reason for Consultation: ***    Chief complaint ***      History of Present Illness:     Triston Villanueva is a 39 y.o. male patient who presents with ***.           Past Medical History:   Diagnosis Date    Hepatitis C     Hypertension        Past Surgical History:   Procedure Laterality Date    CHOLECYSTECTOMY WITH INTRAOPERATIVE CHOLANGIOGRAM N/A 6/3/2020    Procedure: CHOLECYSTECTOMY LAPAROSCOPIC INTRAOPERATIVE CHOLANGIOGRAPHY;  Surgeon: George Day MD;  Location: [facility];  Service: General;  Laterality: N/A;       History reviewed. No pertinent family history.    Social History     Socioeconomic History    Marital status:    Tobacco Use    Smoking status: Every Day     Current packs/day: 1.00     Types: Cigarettes    Smokeless tobacco: Never   Vaping Use    Vaping status: Every Day    Substances: THC    Devices: Disposable   Substance and Sexual Activity    Alcohol use: Never    Drug use: Yes     Types: IV, Heroin, Methamphetamines     Comment: relapsed    Sexual activity: Defer         Current Facility-Administered Medications:     acetaminophen (TYLENOL) tablet 650 mg, 650 mg, Oral, Q4H PRN, 650 mg at 25 0239 **OR** acetaminophen (TYLENOL) 160 MG/5ML oral solution 650 mg, 650 mg, Oral, Q4H PRN **OR** acetaminophen (TYLENOL) suppository 650 mg, 650 mg, Rectal, Q4H PRN, Raymond Tovar DO    sennosides-docusate (PERICOLACE) 8.6-50 MG per tablet 2 tablet, 2 tablet, Oral, BID PRN **AND** polyethylene glycol (MIRALAX) packet 17 g, 17 g, Oral, Daily PRN **AND** bisacodyl (DULCOLAX) EC tablet 5 mg, 5 mg, Oral, Daily PRN **AND** bisacodyl (DULCOLAX) suppository 10 mg, 10  mg, Rectal, Daily PRN, Raymond Tovar DO    Calcium Replacement - Follow Nurse / BPA Driven Protocol, , Not Applicable, PRN, Raymond Tvoar DO    Enoxaparin Sodium (LOVENOX) syringe 40 mg, 40 mg, Subcutaneous, Daily, Raymond Tovar DO, 40 mg at 02/11/25 0908    ketorolac (TORADOL) injection 15 mg, 15 mg, Intravenous, Q6H PRN, Raymond Tovar DO, 15 mg at 02/10/25 1839    Magnesium Cardiology Dose Replacement - Follow Nurse / BPA Driven Protocol, , Not Applicable, PRNLa Robert, DO    methadone (DOLOPHINE) tablet 130 mg, 130 mg, Oral, Daily, Raymond Tovar DO, 130 mg at 02/11/25 0908    nicotine (NICODERM CQ) 14 MG/24HR patch 1 patch, 1 patch, Transdermal, Q24H, Morgan Back, APRN, 1 patch at 02/11/25 0910    nitroglycerin (NITROSTAT) SL tablet 0.4 mg, 0.4 mg, Sublingual, Q5 Min PRN, Raymond Tovar DO    ondansetron (ZOFRAN) injection 4 mg, 4 mg, Intravenous, Q6H PRN, Raymond Tovar DO    Pharmacy to dose vancomycin, , Not Applicable, Continuous PRN, Raymond Tovar DO    Pharmacy to Dose Zosyn, , Not Applicable, Continuous PRN, Raymond Tovar DO    Phosphorus Replacement - Follow Nurse / BPA Driven Protocol, , Not Applicable, PRN, Raymond Tovar DO    piperacillin-tazobactam (ZOSYN) IVPB 3.375 g IVPB in 100 mL NS (VTB), 3.375 g, Intravenous, Q8H, Raymond Tovar DO, 3.375 g at 02/11/25 1225    Potassium Replacement - Follow Nurse / BPA Driven Protocol, , Not Applicable, PRNLa Robert, DO    sodium chloride 0.9 % flush 10 mL, 10 mL, Intravenous, Q12H, Raymond Tovar DO, 10 mL at 02/11/25 0932    sodium chloride 0.9 % flush 10 mL, 10 mL, Intravenous, PRN, Raymond Tovar DO    sodium chloride 0.9 % infusion 40 mL, 40 mL, Intravenous, PRN, Raymond Tovar DO    vancomycin (VANCOCIN) 1,500 mg in sodium chloride 0.9 % 500 mL IVPB, 1,500 mg, Intravenous, Q12H, Raymond Tovar DO, Last Rate: 333.3 mL/hr at 02/11/25 1042, 1,500 mg at 02/11/25 1042    Medications Prior to Admission   Medication Sig Dispense Refill  "Last Dose/Taking    acetaminophen (TYLENOL) 325 MG tablet Take 2 tablets by mouth Every 6 (Six) Hours As Needed for Mild Pain . 20 tablet 0 Past Week    METHADONE 50MG/ML ORAL CONC Take 2.7 mL by mouth Daily.   2/10/2025    OXcarbazepine (TRILEPTAL) 150 MG tablet Take 1 tablet by mouth 2 (Two) Times a Day.   Past Week    cephalexin (KEFLEX) 500 MG capsule Take 2 capsules by mouth 2 (Two) Times a Day for 7 days. 28 capsule 0     sulfamethoxazole-trimethoprim (BACTRIM DS,SEPTRA DS) 800-160 MG per tablet Take 1 tablet by mouth 2 (Two) Times a Day for 7 days. 14 tablet 0        No Known Allergies    Review of Systems    OBJECTIVE:     Vital Signs  Temp:  [97.2 °F (36.2 °C)-98.3 °F (36.8 °C)] 97.2 °F (36.2 °C)  Heart Rate:  [87-96] 95  Resp:  [18] 18  BP: (140-160)/() 156/98    I/O this shift:  In: 460 [P.O.:460]  Out: -   I/O last 3 completed shifts:  In: 1659 [I.V.:1059; IV Piggyback:600]  Out: -       Physical Exam:    {General Physical Exam:43556}    Results Review:   {Results Review:88686::\"I have reviewed the entirety of the patient's clinical lab results.  I have also personally reviewed the patient's imaging\"}      Lab Results (last 72 hours)       Procedure Component Value Units Date/Time    Blood Culture - Blood, Hand, Right [[id number]]  (Normal) Collected: 02/10/25 1306    Specimen: Blood from Hand, Right Updated: 02/11/25 1315     Blood Culture No growth at 24 hours    Blood Culture - Blood, Arm, Left [[id number]]  (Normal) Collected: 02/10/25 1306    Specimen: Blood from Arm, Left Updated: 02/11/25 1315     Blood Culture No growth at 24 hours    MRSA Screen, PCR (Inpatient) - Swab, Nares [[id number]] Collected: 02/11/25 1155    Specimen: Swab from Nares Updated: 02/11/25 1204    Basic Metabolic Panel [[id number]]  (Normal) Collected: 02/11/25 1041    Specimen: Blood Updated: 02/11/25 1135     Glucose 97 mg/dL      BUN 11 mg/dL      Creatinine 1.00 mg/dL      Sodium 141 mmol/L      Potassium 4.6 mmol/L     "  Chloride 104 mmol/L      CO2 26.0 mmol/L      Calcium 9.2 mg/dL      BUN/Creatinine Ratio 11.0     Anion Gap 11.0 mmol/L      eGFR 97.0 mL/min/1.73     Narrative:      GFR Categories in Chronic Kidney Disease (CKD)      GFR Category          GFR (mL/min/1.73)    Interpretation  G1                     90 or greater         Normal or high (1)  G2                      60-89                Mild decrease (1)  G3a                   45-59                Mild to moderate decrease  G3b                   30-44                Moderate to severe decrease  G4                    15-29                Severe decrease  G5                    14 or less           Kidney failure          (1)In the absence of evidence of kidney disease, neither GFR category G1 or G2 fulfill the criteria for CKD.    eGFR calculation 2021 CKD-EPI creatinine equation, which does not include race as a factor    Magnesium [[id number]]  (Normal) Collected: 02/11/25 1041    Specimen: Blood Updated: 02/11/25 1135     Magnesium 2.3 mg/dL     Phosphorus [[id number]]  (Normal) Collected: 02/11/25 1041    Specimen: Blood Updated: 02/11/25 1135     Phosphorus 3.4 mg/dL     CBC & Differential [[id number]]  (Normal) Collected: 02/11/25 1041    Specimen: Blood Updated: 02/11/25 1110    Narrative:      The following orders were created for panel order CBC & Differential.  Procedure                               Abnormality         Status                     ---------                               -----------         ------                     CBC Auto Differential[[id number]]        Normal              Final result                 Please view results for these tests on the individual orders.    CBC Auto Differential [[id number]]  (Normal) Collected: 02/11/25 1041    Specimen: Blood Updated: 02/11/25 1110     WBC 5.03 10*3/mm3      RBC 4.46 10*6/mm3      Hemoglobin 13.2 g/dL      Hematocrit 39.5 %      MCV 88.6 fL      MCH 29.6 pg      MCHC 33.4 g/dL      RDW 12.3 %       RDW-SD 40.4 fl      MPV 9.6 fL      Platelets 298 10*3/mm3      Neutrophil % 54.5 %      Lymphocyte % 30.2 %      Monocyte % 10.9 %      Eosinophil % 3.2 %      Basophil % 1.0 %      Immature Grans % 0.2 %      Neutrophils, Absolute 2.74 10*3/mm3      Lymphocytes, Absolute 1.52 10*3/mm3      Monocytes, Absolute 0.55 10*3/mm3      Eosinophils, Absolute 0.16 10*3/mm3      Basophils, Absolute 0.05 10*3/mm3      Immature Grans, Absolute 0.01 10*3/mm3      nRBC 0.0 /100 WBC     Wound Culture - Wound, Arm, Right [[id number]] Collected: 02/10/25 1222    Specimen: Wound from Arm, Right Updated: 02/11/25 0943     Wound Culture Growth present, too young to evaluate     Gram Stain Moderate (3+) WBCs seen      Few (2+) Gram positive cocci in pairs and chains    Sedimentation Rate [[id number]]  (Abnormal) Collected: 02/10/25 1458    Specimen: Blood Updated: 02/10/25 1527     Sed Rate 21 mm/hr     Lactic Acid, Plasma [[id number]]  (Normal) Collected: 02/10/25 1458    Specimen: Blood Updated: 02/10/25 1522     Lactate 0.8 mmol/L     CBC & Differential [[id number]]  (Abnormal) Collected: 02/10/25 1348    Specimen: Blood Updated: 02/10/25 1510    Narrative:      The following orders were created for panel order CBC & Differential.  Procedure                               Abnormality         Status                     ---------                               -----------         ------                     CBC Auto Differential[[id number]]        Abnormal            Final result               Scan Slide[[id number]]                                                                    Please view results for these tests on the individual orders.    CBC Auto Differential [[id number]]  (Abnormal) Collected: 02/10/25 1458    Specimen: Blood Updated: 02/10/25 1510     WBC 6.53 10*3/mm3      RBC 4.21 10*6/mm3      Hemoglobin 12.2 g/dL      Hematocrit 36.8 %      MCV 87.4 fL      MCH 29.0 pg      MCHC 33.2 g/dL      RDW 12.5 %      RDW-SD 39.8 fl       MPV 9.5 fL      Platelets 272 10*3/mm3      Neutrophil % 54.8 %      Lymphocyte % 29.7 %      Monocyte % 10.7 %      Eosinophil % 3.7 %      Basophil % 0.8 %      Immature Grans % 0.3 %      Neutrophils, Absolute 3.58 10*3/mm3      Lymphocytes, Absolute 1.94 10*3/mm3      Monocytes, Absolute 0.70 10*3/mm3      Eosinophils, Absolute 0.24 10*3/mm3      Basophils, Absolute 0.05 10*3/mm3      Immature Grans, Absolute 0.02 10*3/mm3      nRBC 0.0 /100 WBC     Comprehensive Metabolic Panel [[id number]]  (Abnormal) Collected: 02/10/25 1348    Specimen: Blood Updated: 02/10/25 1426     Glucose 109 mg/dL      BUN 11 mg/dL      Creatinine 0.85 mg/dL      Sodium 137 mmol/L      Potassium 4.8 mmol/L      Comment: Specimen hemolyzed.  Result may be falsely elevated.        Chloride 100 mmol/L      CO2 26.9 mmol/L      Calcium 9.0 mg/dL      Total Protein 7.1 g/dL      Albumin 4.1 g/dL      ALT (SGPT) 8 U/L      Comment: Specimen hemolyzed.  Result may  be falsely elevated.        AST (SGOT) 19 U/L      Comment: Specimen hemolyzed.  Result may be falsely elevated.        Alkaline Phosphatase 87 U/L      Total Bilirubin 0.5 mg/dL      Globulin 3.0 gm/dL      A/G Ratio 1.4 g/dL      BUN/Creatinine Ratio 12.9     Anion Gap 10.1 mmol/L      eGFR 112.0 mL/min/1.73     Narrative:      GFR Categories in Chronic Kidney Disease (CKD)      GFR Category          GFR (mL/min/1.73)    Interpretation  G1                     90 or greater         Normal or high (1)  G2                      60-89                Mild decrease (1)  G3a                   45-59                Mild to moderate decrease  G3b                   30-44                Moderate to severe decrease  G4                    15-29                Severe decrease  G5                    14 or less           Kidney failure          (1)In the absence of evidence of kidney disease, neither GFR category G1 or G2 fulfill the criteria for CKD.    eGFR calculation 2021 CKD-EPI creatinine  equation, which does not include race as a factor    C-reactive Protein [[id number]]  (Abnormal) Collected: 02/10/25 1348    Specimen: Blood Updated: 02/10/25 1426     C-Reactive Protein 1.28 mg/dL                             ASSESSMENT/PLAN:      Melanie Miller MD  02/11/25

## 2025-02-11 NOTE — PLAN OF CARE
Goal Outcome Evaluation:  Plan of Care Reviewed With: patient        Progress: improving  Outcome Evaluation: VSS. IV antibiotics administered per MD order. Plan of care ongoing.

## 2025-02-11 NOTE — PROGRESS NOTES
[facility] HOSPITALIST    PROGRESS NOTE    Name:  Triston Villanueva   Age:  39 y.o.  Sex:  male  :  1985  MRN:  [id number]   Visit Number:  [id number]  Admission Date:  2/10/2025  Date Of Service:  25  Primary Care Physician:  Chela Bain APRN     LOS: 0 days :    Chief Complaint:      Upper arm pain     Subjective:    Patient was seen and examined at bedside today.  Significant other/fiancé at bedside.  Patient sitting up on the side of the bed and appears comfortable with no distress.  Patient reports that he was using IV drugs and missed the IV and got the infection.  He denies any pain or discomfort.  Denies any acute events overnight.  Vital stable, afebrile, on room air.    Hospital Course:    Patient is a 39-year-old male brought to the emergency department for evaluation of right upper arm pain. Started several days ago. Patient was previously seen in the emergency department 2 days prior, for similar complaints. He was diagnosed with an abscess in the right upper extremity at that time. Was offered incision and drainage. Patient refused. Patient was discharged home on oral medications. Patient did not take oral medications. Patient return to the ED after the wound had opened up and started draining. He describes the pain as burning throbbing, localized. Patient has a known history of polysubstance use disorder, states that he shot up fentanyl and amphetamine 2 days ago. Reports that he has a bottle of methadone in his pocket, that he had gone from his methadone clinic. Hospitalist services consulted for admission.     Review of Systems:     All systems were reviewed and negative except as mentioned in subjective, assessment and plan.    Vital Signs:    Temp:  [97.2 °F (36.2 °C)-98.3 °F (36.8 °C)] 97.2 °F (36.2 °C)  Heart Rate:  [63-96] 95  Resp:  [17-18] 18  BP: (113-160)/() 149/98    Intake and output:    I/O last 3 completed shifts:  In: 1659 [I.V.:1059; IV  Piggyback:600]  Out: -   No intake/output data recorded.    Physical Examination:    General Appearance:  Alert and cooperative.  No acute distress.   Head:  Atraumatic and normocephalic.   Eyes: Conjunctivae and sclerae normal, no icterus. No pallor.   Throat: No oral lesions, no thrush, oral mucosa moist.   Neck: Supple, trachea midline, no thyromegaly.   Lungs:   Breath sounds heard bilaterally equally.  No wheezing or crackles. No Pleural rub or bronchial breathing.   Heart:  Normal S1 and S2, no murmur, no gallop, no rub. No JVD.   Abdomen:   Normal bowel sounds, no masses, no organomegaly. Soft, nontender, nondistended, no rebound tenderness.   Extremities: Supple, no edema, no cyanosis, no clubbing.  Right upper arm cellulitis with swelling/induration and redness.  No palpable abscess.  Please refer to pictures on file for further details.  Redness, swelling and induration appears to be improving.  Neurovascular intact distally.  Palpable pulses distally.  Full range of motion of his right upper extremity otherwise.   Skin: No bleeding.  Right upper arm cellulitis as per above, please refer to pictures on file for details.   Neurologic: Alert and oriented x 3. No facial asymmetry. Moves all four limbs. No tremors.      Laboratory results:    Results from last 7 days   Lab Units 02/11/25  1041 02/10/25  1348   SODIUM mmol/L 141 137   POTASSIUM mmol/L 4.6 4.8   CHLORIDE mmol/L 104 100   CO2 mmol/L 26.0 26.9   BUN mg/dL 11 11   CREATININE mg/dL 1.00 0.85   CALCIUM mg/dL 9.2 9.0   BILIRUBIN mg/dL  --  0.5   ALK PHOS U/L  --  87   ALT (SGPT) U/L  --  8   AST (SGOT) U/L  --  19   GLUCOSE mg/dL 97 109*     Results from last 7 days   Lab Units 02/11/25  1041 02/10/25  1458   WBC 10*3/mm3 5.03 6.53   HEMOGLOBIN g/dL 13.2 12.2*   HEMATOCRIT % 39.5 36.8*   PLATELETS 10*3/mm3 298 272             Results from last 7 days   Lab Units 02/10/25  1222   WOUNDCX  Growth present, too young to evaluate     No results for  "input(s): \"PHART\", \"YOV0JIE\", \"PO2ART\", \"WBD4CQA\", \"BASEEXCESS\" in the last 8760 hours.   I have reviewed the patient's laboratory results.    Radiology results:    CT Upper Extremity Right With Contrast    Result Date: 2/10/2025  CT SCAN SIDE EXTREMITY.     2/10/2025 2:25 PM  HISTORY: Evaluation for abscess/osteomyelitis .  PROCEDURE: Axial images were obtained through the by computed tomography. Sagittal and coronal reconstruction images were performed. This study was performed with techniques to keep radiation doses as low as reasonably achievable, (ALARA). Individualized dose reduction techniques using automated exposure control or adjustment of mA and/or kV according to the patient size were employed.  COMPARISON: None.  FINDINGS: Bones: No acute fracture or malalignment.  Soft tissues: Mildly enlarged right axillary lymph nodes, likely reactive/inflammatory. In the anterior mid forearm there is focal skin thickening, soft tissue ulceration, and subcutaneous inflammatory fat stranding, consistent with cellulitis with ulceration. The ulcerated area is measuring approximately 1.5 cm (series 2, image 137).  No abnormal discrete fluid collections to suggest abscess. No muscular or osseous involvement is identified.  Mild medial and posterior distal forearm subcutaneous soft tissue swelling.      Impression: No evidence of osteomyelitis. No abscess.  Anterior mid forearm soft tissue ulceration and cellulitis. Medial and posterior distal arm/elbow edema/cellulitis.    Images personally reviewed, interpreted and dictated by EMBER Lambert.   CTDI: DLP: 457.9 mGy.cm   This study was performed with techniques to keep radiation doses as low as reasonably achievable (ALARA). Individualized dose reduction techniques using automated exposure control or adjustment of mA and/or kV according to the patient size were employed.      This report was signed and finalized on 2/10/2025 3:49 PM by EMBER Lambert.     I " have reviewed the patient's radiology reports.    Medication Review:     I have reviewed the patient's active and prn medications.     Problem List:      Abscess      Assessment:    Right upper extremity cellulitis  Polysubstance use disorder  Hypertension    Plan:    Right upper extremity cellulitis  Surgery aware the patient, consulted in ED.  Appreciate their recommendations.  CT of right upper extremity with contrast showed No evidence of osteomyelitis. No abscess. Anterior mid forearm soft tissue ulceration and cellulitis. Medial and posterior distal arm/elbow edema/cellulitis.  Patient was not septic.  IV Zosyn, IV vancomycin.    Blood cultures remains negative   Wound culture growing Staphylococcus, coagulase-negative, common skin gita.  I discussed the case with Dr. Miller with general surgery, no indication for surgery/I&D currently.    Polysubstance use disorder  Resume methadone  Counseling on avoidance of any illicit drugs    Hypertension  Resume home medications as appropriate.     -I have reviewed the copied text and it is accurate as of 2/11/2025     DVT Prophylaxis: Lovenox  Code Status: Full code  Diet: Cardiac  Discharge Plan: Likely needs 1 to 2 days in the hospital    Hermilo Farmer MD  02/11/25  11:59 EST    Dictated utilizing Dragon dictation.

## 2025-02-11 NOTE — PLAN OF CARE
Problem: Adult Inpatient Plan of Care  Goal: Plan of Care Review  Outcome: Progressing  Goal: Patient-Specific Goal (Individualized)  Outcome: Progressing  Goal: Absence of Hospital-Acquired Illness or Injury  Outcome: Progressing  Intervention: Identify and Manage Fall Risk  Recent Flowsheet Documentation  Taken 2/11/2025 0200 by Jazmin Bowman RN  Safety Promotion/Fall Prevention:   safety round/check completed   nonskid shoes/slippers when out of bed   clutter free environment maintained   assistive device/personal items within reach  Taken 2/11/2025 0000 by Jazmin Bowman RN  Safety Promotion/Fall Prevention:   safety round/check completed   nonskid shoes/slippers when out of bed   clutter free environment maintained   assistive device/personal items within reach  Taken 2/10/2025 2200 by Jazmin Bowman RN  Safety Promotion/Fall Prevention:   activity supervised   assistive device/personal items within reach   safety round/check completed   nonskid shoes/slippers when out of bed  Taken 2/10/2025 2000 by Jazmin Bowman RN  Safety Promotion/Fall Prevention:   safety round/check completed   nonskid shoes/slippers when out of bed   clutter free environment maintained   assistive device/personal items within reach  Intervention: Prevent Skin Injury  Recent Flowsheet Documentation  Taken 2/11/2025 0200 by Jazmin Bowman RN  Body Position:   side-lying   turned   right   position changed independently   legs elevated  Taken 2/11/2025 0000 by Jazmin Bowman RN  Body Position:   side-lying   turned   right   legs elevated  Taken 2/10/2025 2200 by Jazmin Bowman RN  Body Position:   legs elevated   position changed independently   supine  Taken 2/10/2025 2000 by Jazmin Bowman RN  Body Position:   sitting up in bed   legs elevated   position changed independently  Intervention: Prevent and Manage VTE (Venous Thromboembolism) Risk  Recent Flowsheet Documentation  Taken 2/10/2025  2000 by Jazmin Bowman RN  VTE Prevention/Management:   bilateral   SCDs (sequential compression devices) off  Intervention: Prevent Infection  Recent Flowsheet Documentation  Taken 2/11/2025 0200 by Jazmin Bowman RN  Infection Prevention:   environmental surveillance performed   rest/sleep promoted   single patient room provided  Taken 2/11/2025 0000 by Jazmin Bowman RN  Infection Prevention:   environmental surveillance performed   rest/sleep promoted   single patient room provided  Taken 2/10/2025 2200 by Jazmin Bowman RN  Infection Prevention:   environmental surveillance performed   rest/sleep promoted   single patient room provided  Taken 2/10/2025 2000 by Jazmin Bowman RN  Infection Prevention:   environmental surveillance performed   rest/sleep promoted   single patient room provided  Goal: Optimal Comfort and Wellbeing  Outcome: Progressing  Intervention: Provide Person-Centered Care  Recent Flowsheet Documentation  Taken 2/10/2025 2000 by Jazmin Bowman RN  Trust Relationship/Rapport:   care explained   emotional support provided   choices provided   empathic listening provided   questions answered   questions encouraged   reassurance provided   thoughts/feelings acknowledged  Goal: Readiness for Transition of Care  Outcome: Progressing   Goal Outcome Evaluation:            New admit from ED.  VSS on RA.  IV abx continued.  Dressing applied to right abscess.  No acute events during shift.  POC ongoing.

## 2025-02-12 LAB
ANION GAP SERPL CALCULATED.3IONS-SCNC: 11.2 MMOL/L (ref 5–15)
BUN SERPL-MCNC: 12 MG/DL (ref 6–20)
BUN/CREAT SERPL: 12.2 (ref 7–25)
CALCIUM SPEC-SCNC: 8.9 MG/DL (ref 8.6–10.5)
CHLORIDE SERPL-SCNC: 103 MMOL/L (ref 98–107)
CO2 SERPL-SCNC: 22.8 MMOL/L (ref 22–29)
CREAT SERPL-MCNC: 0.98 MG/DL (ref 0.76–1.27)
DEPRECATED RDW RBC AUTO: 38.9 FL (ref 37–54)
EGFRCR SERPLBLD CKD-EPI 2021: 99.3 ML/MIN/1.73
ERYTHROCYTE [DISTWIDTH] IN BLOOD BY AUTOMATED COUNT: 12.2 % (ref 12.3–15.4)
GLUCOSE SERPL-MCNC: 133 MG/DL (ref 65–99)
HCT VFR BLD AUTO: 40.1 % (ref 37.5–51)
HGB BLD-MCNC: 13.3 G/DL (ref 13–17.7)
MCH RBC QN AUTO: 28.8 PG (ref 26.6–33)
MCHC RBC AUTO-ENTMCNC: 33.2 G/DL (ref 31.5–35.7)
MCV RBC AUTO: 86.8 FL (ref 79–97)
PLATELET # BLD AUTO: 259 10*3/MM3 (ref 140–450)
PMV BLD AUTO: 10.4 FL (ref 6–12)
POTASSIUM SERPL-SCNC: 4.3 MMOL/L (ref 3.5–5.2)
RBC # BLD AUTO: 4.62 10*6/MM3 (ref 4.14–5.8)
SODIUM SERPL-SCNC: 137 MMOL/L (ref 136–145)
VANCOMYCIN SERPL-MCNC: 15.5 MCG/ML (ref 5–40)
WBC NRBC COR # BLD AUTO: 3.56 10*3/MM3 (ref 3.4–10.8)

## 2025-02-12 PROCEDURE — 85027 COMPLETE CBC AUTOMATED: CPT | Performed by: FAMILY MEDICINE

## 2025-02-12 PROCEDURE — 80048 BASIC METABOLIC PNL TOTAL CA: CPT | Performed by: FAMILY MEDICINE

## 2025-02-12 PROCEDURE — 25010000002 VANCOMYCIN 1.5 G RECONSTITUTED SOLUTION 1 EACH VIAL: Performed by: FAMILY MEDICINE

## 2025-02-12 PROCEDURE — 96372 THER/PROPH/DIAG INJ SC/IM: CPT

## 2025-02-12 PROCEDURE — 25010000002 PIPERACILLIN SOD-TAZOBACTAM PER 1 G: Performed by: FAMILY MEDICINE

## 2025-02-12 PROCEDURE — 99232 SBSQ HOSP IP/OBS MODERATE 35: CPT | Performed by: FAMILY MEDICINE

## 2025-02-12 PROCEDURE — 25010000002 ENOXAPARIN PER 10 MG: Performed by: FAMILY MEDICINE

## 2025-02-12 PROCEDURE — 25810000003 SODIUM CHLORIDE 0.9 % SOLUTION 500 ML FLEX CONT: Performed by: FAMILY MEDICINE

## 2025-02-12 PROCEDURE — 80202 ASSAY OF VANCOMYCIN: CPT | Performed by: FAMILY MEDICINE

## 2025-02-12 RX ORDER — OXCARBAZEPINE 300 MG/1
150 TABLET, FILM COATED ORAL 2 TIMES DAILY
Status: DISCONTINUED | OUTPATIENT
Start: 2025-02-12 | End: 2025-02-13 | Stop reason: HOSPADM

## 2025-02-12 RX ORDER — BUPROPION HYDROCHLORIDE 450 MG/1
450 TABLET, FILM COATED, EXTENDED RELEASE ORAL DAILY
COMMUNITY

## 2025-02-12 RX ADMIN — OXCARBAZEPINE 150 MG: 300 TABLET, FILM COATED ORAL at 09:39

## 2025-02-12 RX ADMIN — PIPERACILLIN SODIUM AND TAZOBACTAM SODIUM 3.38 G: 3; .375 INJECTION, POWDER, LYOPHILIZED, FOR SOLUTION INTRAVENOUS at 13:24

## 2025-02-12 RX ADMIN — VANCOMYCIN HYDROCHLORIDE 1500 MG: 1.5 INJECTION, POWDER, LYOPHILIZED, FOR SOLUTION INTRAVENOUS at 00:01

## 2025-02-12 RX ADMIN — VANCOMYCIN HYDROCHLORIDE 1500 MG: 1.5 INJECTION, POWDER, LYOPHILIZED, FOR SOLUTION INTRAVENOUS at 12:00

## 2025-02-12 RX ADMIN — NICOTINE 1 PATCH: 14 PATCH TRANSDERMAL at 09:40

## 2025-02-12 RX ADMIN — PIPERACILLIN SODIUM AND TAZOBACTAM SODIUM 3.38 G: 3; .375 INJECTION, POWDER, LYOPHILIZED, FOR SOLUTION INTRAVENOUS at 03:37

## 2025-02-12 RX ADMIN — METHADONE HYDROCHLORIDE 130 MG: 10 TABLET ORAL at 09:39

## 2025-02-12 RX ADMIN — ENOXAPARIN SODIUM 40 MG: 100 INJECTION SUBCUTANEOUS at 09:39

## 2025-02-12 RX ADMIN — PIPERACILLIN SODIUM AND TAZOBACTAM SODIUM 3.38 G: 3; .375 INJECTION, POWDER, LYOPHILIZED, FOR SOLUTION INTRAVENOUS at 20:08

## 2025-02-12 RX ADMIN — OXCARBAZEPINE 150 MG: 300 TABLET, FILM COATED ORAL at 20:09

## 2025-02-12 RX ADMIN — Medication 10 ML: at 20:21

## 2025-02-12 NOTE — CASE MANAGEMENT/SOCIAL WORK
Case Management/Social Work    Patient Name:  Triston Villanueva  YOB: 1985  MRN: [id number]  Admit Date:  2/10/2025    Patient's room smells of marijuana.  ANGELI Dudley is aware.      Electronically signed by:  Minerva Doss RN  02/12/25 11:35 EST

## 2025-02-12 NOTE — CASE MANAGEMENT/SOCIAL WORK
Continued Stay Note  [facility]     Patient Name: Triston Villanueva  MRN: [id number]  Today's Date: 2/12/2025    Admit Date: 2/10/2025    Plan: Home with family   Discharge Plan    No documentation.                  Discharge Codes    No documentation.                       Minerva Doss RN

## 2025-02-12 NOTE — PHARMACY RECOMMENDATION
"Pharmacy Consult - Vancomycin Dosing    Pharmacy was consulted to dose vancomycin for  Triston Villanueva, a 39 y.o. male  180.3 cm (71\") 98.8 kg (217 lb 13 oz)    Indication: RUE Abscess  Consulting Provider: Dr. Farmer    Goal AUC: 400-600 mg/L*hr.    Labs  Results from last 7 days   Lab Units 02/12/25  0830 02/11/25  1041 02/10/25  1458 02/10/25  1348   WBC 10*3/mm3 3.56 5.03 6.53  --    CREATININE mg/dL 0.98 1.00  --  0.85      Estimated Creatinine Clearance: 118.8 mL/min (by C-G formula based on SCr of 0.98 mg/dL).  Results from last 7 days   Lab Units 02/12/25  0830   VANCOMYCIN RM mcg/mL 15.50               Other Antimicrobials    Zosyn 3.375 g IV every 8 hours     InsightRX AUC Calculation    Current dose: 1500 mg IV every 12 hours   Predicted Steady State AUC on Current Dose: 489 mg/L*hr    Assessment/Plan    Patient currently receiving vancomycin 1500 mg IV every 12 hours. Random vancomycin level this AM resulted at 15.5 mcg/ml with an expected steady state .  Continue current dose.   Pharmacy will continue to monitor renal function, cultures and sensitivities, and clinical status to adjust regimen as necessary.      Thank you,  Chey Everett, PharmD, BCPS  02/12/25 09:20 EST    "

## 2025-02-12 NOTE — PROGRESS NOTES
[facility] HOSPITALIST    PROGRESS NOTE    Name:  Triston Villanueva   Age:  39 y.o.  Sex:  male  :  1985  MRN:  [id number]   Visit Number:  [id number]  Admission Date:  2/10/2025  Date Of Service:  25  Primary Care Physician:  Chela Bain APRN     LOS: 1 day :    Chief Complaint:      Upper arm pain     Subjective:    Patient was seen and examined at bedside today.  Significant other/fiancé at bedside.  Patient sitting up in the chair and appears comfortable.  Patient reports improvement on the redness and swelling, denies any pain in his right arm.  Area of cellulitis appears to be improving slowly and with less swelling and induration today.  No other acute events overnight.  Vitals are stable, afebrile.    Hospital Course:    Patient is a 39-year-old male brought to the emergency department for evaluation of right upper arm pain. Started several days ago. Patient was previously seen in the emergency department 2 days prior, for similar complaints. He was diagnosed with an abscess in the right upper extremity at that time. Was offered incision and drainage. Patient refused. Patient was discharged home on oral medications. Patient did not take oral medications. Patient return to the ED after the wound had opened up and started draining. He describes the pain as burning throbbing, localized. Patient has a known history of polysubstance use disorder, states that he shot up fentanyl and amphetamine 2 days ago. Reports that he has a bottle of methadone in his pocket, that he had gone from his methadone clinic. Hospitalist services consulted for admission.     Review of Systems:     All systems were reviewed and negative except as mentioned in subjective, assessment and plan.    Vital Signs:    Temp:  [97.3 °F (36.3 °C)-97.7 °F (36.5 °C)] 97.7 °F (36.5 °C)  Heart Rate:  [81-82] 82  Resp:  [18] 18  BP: (117-163)/(73-96) 132/89    Intake and output:    I/O last 3 completed shifts:  In:  1639 [P.O.:580; I.V.:1059]  Out: -   I/O this shift:  In: 700 [P.O.:700]  Out: -     Physical Examination:    General Appearance:  Alert and cooperative.  No acute distress.   Head:  Atraumatic and normocephalic.   Eyes: Conjunctivae and sclerae normal, no icterus. No pallor.   Throat: No oral lesions, no thrush, oral mucosa moist.   Neck: Supple, trachea midline, no thyromegaly.   Lungs:   Breath sounds heard bilaterally equally.  No wheezing or crackles. No Pleural rub or bronchial breathing.   Heart:  Normal S1 and S2, no murmur, no gallop, no rub. No JVD.   Abdomen:   Normal bowel sounds, no masses, no organomegaly. Soft, nontender, nondistended, no rebound tenderness.   Extremities: Supple, no edema, no cyanosis, no clubbing.  Right upper arm cellulitis with swelling/induration and redness.  No palpable abscess.  Please refer to pictures on file for further details.  Redness, swelling and induration appears to be improving.  Neurovascular intact distally.  Palpable pulses distally.  Full range of motion of his right upper extremity otherwise.   Skin: No bleeding.  Right upper arm cellulitis as per above, please refer to pictures on file for details.   Neurologic: Alert and oriented x 3. No facial asymmetry. Moves all four limbs. No tremors.      Laboratory results:    Results from last 7 days   Lab Units 02/12/25  0830 02/11/25  1041 02/10/25  1348   SODIUM mmol/L 137 141 137   POTASSIUM mmol/L 4.3 4.6 4.8   CHLORIDE mmol/L 103 104 100   CO2 mmol/L 22.8 26.0 26.9   BUN mg/dL 12 11 11   CREATININE mg/dL 0.98 1.00 0.85   CALCIUM mg/dL 8.9 9.2 9.0   BILIRUBIN mg/dL  --   --  0.5   ALK PHOS U/L  --   --  87   ALT (SGPT) U/L  --   --  8   AST (SGOT) U/L  --   --  19   GLUCOSE mg/dL 133* 97 109*     Results from last 7 days   Lab Units 02/12/25  0830 02/11/25  1041 02/10/25  1458   WBC 10*3/mm3 3.56 5.03 6.53   HEMOGLOBIN g/dL 13.3 13.2 12.2*   HEMATOCRIT % 40.1 39.5 36.8*   PLATELETS 10*3/mm3 259 298 272            "  Results from last 7 days   Lab Units 02/10/25  1306 02/10/25  1222   BLOODCX  No growth at 2 days  No growth at 2 days  --    WOUNDCX   --  Scant growth (1+) Staphylococcus, coagulase negative*     No results for input(s): \"PHART\", \"EHO7CMH\", \"PO2ART\", \"DZF7WKI\", \"BASEEXCESS\" in the last 8760 hours.   I have reviewed the patient's laboratory results.    Radiology results:    No radiology results from the last 24 hrs  I have reviewed the patient's radiology reports.    Medication Review:     I have reviewed the patient's active and prn medications.     Problem List:      Abscess      Assessment:    Right upper extremity cellulitis  Polysubstance use disorder  Hypertension    Plan:    Right upper extremity cellulitis  Surgery aware the patient, consulted in ED.  Appreciate their recommendations.  CT of right upper extremity with contrast showed No evidence of osteomyelitis. No abscess. Anterior mid forearm soft tissue ulceration and cellulitis. Medial and posterior distal arm/elbow edema/cellulitis.  Patient was not septic.  IV Zosyn, IV vancomycin.    Blood cultures remains negative x 2 days  Wound culture growing Staphylococcus, coagulase-negative, common skin gita.  I discussed the case with Dr. Miller with general surgery, no indication for surgery/I&D currently.    Polysubstance use disorder  Resume methadone  Counseling on avoidance of any illicit drugs    Hypertension  Resume home medications as appropriate.     -I have reviewed the copied text and it is accurate as of 2/12/2025     DVT Prophylaxis: Lovenox  Code Status: Full code  Diet: Cardiac  Discharge Plan: Likely needs 1 to 2 days in the hospital    Hermilo Farmer MD  02/12/25  16:01 EST    Dictated utilizing Dragon dictation.    "

## 2025-02-12 NOTE — PLAN OF CARE
Problem: Adult Inpatient Plan of Care  Goal: Plan of Care Review  Outcome: Progressing  Goal: Patient-Specific Goal (Individualized)  Outcome: Progressing  Goal: Absence of Hospital-Acquired Illness or Injury  Outcome: Progressing  Intervention: Identify and Manage Fall Risk  Recent Flowsheet Documentation  Taken 2/12/2025 0200 by Jazmin Bowman RN  Safety Promotion/Fall Prevention:   safety round/check completed   nonskid shoes/slippers when out of bed   clutter free environment maintained   assistive device/personal items within reach  Taken 2/12/2025 0000 by Jazmin Bowman RN  Safety Promotion/Fall Prevention:   safety round/check completed   nonskid shoes/slippers when out of bed   clutter free environment maintained   assistive device/personal items within reach  Taken 2/11/2025 2200 by Jazmin Bowman RN  Safety Promotion/Fall Prevention:   safety round/check completed   nonskid shoes/slippers when out of bed   clutter free environment maintained   assistive device/personal items within reach  Taken 2/11/2025 2000 by Jazmin Bowman RN  Safety Promotion/Fall Prevention:   safety round/check completed   nonskid shoes/slippers when out of bed   clutter free environment maintained   assistive device/personal items within reach  Intervention: Prevent Skin Injury  Recent Flowsheet Documentation  Taken 2/12/2025 0200 by Jazmin Bowman RN  Body Position:   supine   legs elevated   position changed independently  Taken 2/12/2025 0000 by Jazmin Bowman RN  Body Position:   dangle, side of bed   position changed independently  Taken 2/11/2025 2200 by Jazmin Bowman RN  Body Position:   dangle, side of bed   position changed independently  Taken 2/11/2025 2000 by Jazmin Bowman RN  Body Position:   sitting up in bed   legs elevated   position changed independently  Skin Protection: incontinence pads utilized  Intervention: Prevent and Manage VTE (Venous Thromboembolism)  Risk  Recent Flowsheet Documentation  Taken 2/11/2025 2000 by Jazmin Bowman RN  VTE Prevention/Management:   bilateral   SCDs (sequential compression devices) off  Intervention: Prevent Infection  Recent Flowsheet Documentation  Taken 2/12/2025 0200 by Jazmin Bowman RN  Infection Prevention:   environmental surveillance performed   rest/sleep promoted   single patient room provided  Taken 2/12/2025 0000 by Jazmin Bowman RN  Infection Prevention:   environmental surveillance performed   rest/sleep promoted   single patient room provided  Taken 2/11/2025 2200 by Jazmin Bowman RN  Infection Prevention:   environmental surveillance performed   rest/sleep promoted   single patient room provided  Taken 2/11/2025 2000 by Jazmin Bowman RN  Infection Prevention:   environmental surveillance performed   rest/sleep promoted   single patient room provided  Goal: Optimal Comfort and Wellbeing  Outcome: Progressing  Intervention: Provide Person-Centered Care  Recent Flowsheet Documentation  Taken 2/11/2025 2000 by Jazmin Bowman RN  Trust Relationship/Rapport:   care explained   choices provided   emotional support provided   empathic listening provided   questions answered   questions encouraged   reassurance provided   thoughts/feelings acknowledged  Goal: Readiness for Transition of Care  Outcome: Progressing   Goal Outcome Evaluation:            VSS on RA. IV abx continued.  No acute events during shift. POC ongoing.

## 2025-02-13 ENCOUNTER — READMISSION MANAGEMENT (OUTPATIENT)
Dept: CALL CENTER | Facility: HOSPITAL | Age: 40
End: 2025-02-13
Payer: COMMERCIAL

## 2025-02-13 VITALS
RESPIRATION RATE: 16 BRPM | OXYGEN SATURATION: 94 % | SYSTOLIC BLOOD PRESSURE: 146 MMHG | BODY MASS INDEX: 30.25 KG/M2 | WEIGHT: 216.05 LBS | HEART RATE: 80 BPM | HEIGHT: 71 IN | TEMPERATURE: 97.6 F | DIASTOLIC BLOOD PRESSURE: 99 MMHG

## 2025-02-13 LAB
ANION GAP SERPL CALCULATED.3IONS-SCNC: 8.7 MMOL/L (ref 5–15)
BACTERIA SPEC AEROBE CULT: ABNORMAL
BACTERIA SPEC AEROBE CULT: ABNORMAL
BUN SERPL-MCNC: 15 MG/DL (ref 6–20)
BUN/CREAT SERPL: 14.2 (ref 7–25)
CALCIUM SPEC-SCNC: 8.9 MG/DL (ref 8.6–10.5)
CHLORIDE SERPL-SCNC: 100 MMOL/L (ref 98–107)
CO2 SERPL-SCNC: 27.3 MMOL/L (ref 22–29)
CREAT SERPL-MCNC: 1.06 MG/DL (ref 0.76–1.27)
DEPRECATED RDW RBC AUTO: 39.8 FL (ref 37–54)
EGFRCR SERPLBLD CKD-EPI 2021: 90.4 ML/MIN/1.73
ERYTHROCYTE [DISTWIDTH] IN BLOOD BY AUTOMATED COUNT: 12.2 % (ref 12.3–15.4)
GLUCOSE SERPL-MCNC: 108 MG/DL (ref 65–99)
GRAM STN SPEC: ABNORMAL
GRAM STN SPEC: ABNORMAL
HCT VFR BLD AUTO: 38.4 % (ref 37.5–51)
HGB BLD-MCNC: 12.8 G/DL (ref 13–17.7)
MCH RBC QN AUTO: 29.4 PG (ref 26.6–33)
MCHC RBC AUTO-ENTMCNC: 33.3 G/DL (ref 31.5–35.7)
MCV RBC AUTO: 88.1 FL (ref 79–97)
PLATELET # BLD AUTO: 318 10*3/MM3 (ref 140–450)
PMV BLD AUTO: 9.4 FL (ref 6–12)
POTASSIUM SERPL-SCNC: 4.5 MMOL/L (ref 3.5–5.2)
RBC # BLD AUTO: 4.36 10*6/MM3 (ref 4.14–5.8)
SODIUM SERPL-SCNC: 136 MMOL/L (ref 136–145)
WBC NRBC COR # BLD AUTO: 4.29 10*3/MM3 (ref 3.4–10.8)

## 2025-02-13 PROCEDURE — 80048 BASIC METABOLIC PNL TOTAL CA: CPT | Performed by: FAMILY MEDICINE

## 2025-02-13 PROCEDURE — 25010000002 VANCOMYCIN 1.5 G RECONSTITUTED SOLUTION 1 EACH VIAL: Performed by: FAMILY MEDICINE

## 2025-02-13 PROCEDURE — 85027 COMPLETE CBC AUTOMATED: CPT | Performed by: FAMILY MEDICINE

## 2025-02-13 PROCEDURE — 25010000002 PIPERACILLIN SOD-TAZOBACTAM PER 1 G: Performed by: FAMILY MEDICINE

## 2025-02-13 PROCEDURE — 99238 HOSP IP/OBS DSCHRG MGMT 30/<: CPT | Performed by: FAMILY MEDICINE

## 2025-02-13 PROCEDURE — 25810000003 SODIUM CHLORIDE 0.9 % SOLUTION 500 ML FLEX CONT: Performed by: FAMILY MEDICINE

## 2025-02-13 RX ORDER — DOXYCYCLINE 100 MG/1
100 CAPSULE ORAL 2 TIMES DAILY
Qty: 14 CAPSULE | Refills: 0 | Status: SHIPPED | OUTPATIENT
Start: 2025-02-13 | End: 2025-02-20

## 2025-02-13 RX ORDER — CEPHALEXIN 500 MG/1
500 CAPSULE ORAL 2 TIMES DAILY
Qty: 14 CAPSULE | Refills: 0 | Status: SHIPPED | OUTPATIENT
Start: 2025-02-13 | End: 2025-02-20

## 2025-02-13 RX ORDER — MUPIROCIN 20 MG/G
1 OINTMENT TOPICAL 3 TIMES DAILY
Qty: 22 G | Refills: 0 | Status: SHIPPED | OUTPATIENT
Start: 2025-02-13 | End: 2025-02-23

## 2025-02-13 RX ADMIN — METHADONE HYDROCHLORIDE 130 MG: 10 TABLET ORAL at 09:01

## 2025-02-13 RX ADMIN — SENNOSIDES AND DOCUSATE SODIUM 2 TABLET: 50; 8.6 TABLET ORAL at 09:06

## 2025-02-13 RX ADMIN — Medication 10 ML: at 09:01

## 2025-02-13 RX ADMIN — VANCOMYCIN HYDROCHLORIDE 1500 MG: 1.5 INJECTION, POWDER, LYOPHILIZED, FOR SOLUTION INTRAVENOUS at 00:29

## 2025-02-13 RX ADMIN — PIPERACILLIN SODIUM AND TAZOBACTAM SODIUM 3.38 G: 3; .375 INJECTION, POWDER, LYOPHILIZED, FOR SOLUTION INTRAVENOUS at 03:11

## 2025-02-13 RX ADMIN — OXCARBAZEPINE 150 MG: 300 TABLET, FILM COATED ORAL at 09:01
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
## 2025-02-13 NOTE — PLAN OF CARE
Problem: Adult Inpatient Plan of Care  Goal: Plan of Care Review  Outcome: Progressing  Goal: Patient-Specific Goal (Individualized)  Outcome: Progressing  Goal: Absence of Hospital-Acquired Illness or Injury  Outcome: Progressing  Intervention: Identify and Manage Fall Risk  Recent Flowsheet Documentation  Taken 2/13/2025 0400 by Jazmin Bowman RN  Safety Promotion/Fall Prevention:   safety round/check completed   nonskid shoes/slippers when out of bed   clutter free environment maintained   assistive device/personal items within reach  Taken 2/13/2025 0200 by Jazmin Bowman RN  Safety Promotion/Fall Prevention:   safety round/check completed   nonskid shoes/slippers when out of bed   clutter free environment maintained   assistive device/personal items within reach  Taken 2/13/2025 0000 by Jazmin Bowman RN  Safety Promotion/Fall Prevention:   safety round/check completed   nonskid shoes/slippers when out of bed   clutter free environment maintained   assistive device/personal items within reach  Taken 2/12/2025 2200 by Jazmin Bowman RN  Safety Promotion/Fall Prevention:   safety round/check completed   nonskid shoes/slippers when out of bed   assistive device/personal items within reach   clutter free environment maintained  Taken 2/12/2025 2000 by Jazmin Bowman RN  Safety Promotion/Fall Prevention:   safety round/check completed   nonskid shoes/slippers when out of bed   clutter free environment maintained   assistive device/personal items within reach  Intervention: Prevent Skin Injury  Recent Flowsheet Documentation  Taken 2/13/2025 0400 by Jazmin Bowman RN  Body Position:   turned   right   legs elevated   position changed independently  Taken 2/13/2025 0200 by Jazmin Bowman RN  Body Position:   sitting up in bed   legs elevated  Taken 2/13/2025 0000 by Jazmin Bowman RN  Body Position:   dangle, side of bed   position changed independently   legs elevated  Taken  2/12/2025 2200 by Jazmin Bowman RN  Body Position:   sitting up in bed   legs elevated   position changed independently  Taken 2/12/2025 2000 by Jazmin Bowman RN  Body Position:   dangle, side of bed   legs elevated   position changed independently  Skin Protection: incontinence pads utilized  Intervention: Prevent and Manage VTE (Venous Thromboembolism) Risk  Recent Flowsheet Documentation  Taken 2/12/2025 2000 by Jazmin Bowman RN  VTE Prevention/Management: (see MAR) other (see comments)  Intervention: Prevent Infection  Recent Flowsheet Documentation  Taken 2/13/2025 0400 by Jazmin Bowman RN  Infection Prevention:   environmental surveillance performed   rest/sleep promoted   single patient room provided  Taken 2/13/2025 0200 by Jazmin Bowman RN  Infection Prevention:   environmental surveillance performed   rest/sleep promoted   single patient room provided  Taken 2/13/2025 0000 by Jazmin Bowman RN  Infection Prevention:   rest/sleep promoted   single patient room provided   environmental surveillance performed  Taken 2/12/2025 2200 by Jazmin Bowman RN  Infection Prevention:   environmental surveillance performed   rest/sleep promoted   single patient room provided  Taken 2/12/2025 2000 by Jazmin Bowman RN  Infection Prevention:   environmental surveillance performed   rest/sleep promoted   single patient room provided  Goal: Optimal Comfort and Wellbeing  Outcome: Progressing  Intervention: Provide Person-Centered Care  Recent Flowsheet Documentation  Taken 2/12/2025 2000 by Jazmin Bowman RN  Trust Relationship/Rapport:   care explained   choices provided   emotional support provided   empathic listening provided   questions answered   questions encouraged   reassurance provided   thoughts/feelings acknowledged  Goal: Readiness for Transition of Care  Outcome: Progressing   Goal Outcome Evaluation:         VSS on RA. IV abx continued. No acute events during  shift. POC ongoing.

## 2025-02-13 NOTE — CASE MANAGEMENT/SOCIAL WORK
Case Management Discharge Note      Final Note: DC home with family. Denies any needs.         Selected Continued Care - Discharged on 2/13/2025 Admission date: 2/10/2025 - Discharge disposition: Home or Self Care      Destination    No services have been selected for the patient.                Durable Medical Equipment    No services have been selected for the patient.                Dialysis/Infusion    No services have been selected for the patient.                Home Medical Care    No services have been selected for the patient.                Therapy    No services have been selected for the patient.                Community Resources    No services have been selected for the patient.                Community & DME    No services have been selected for the patient.                    Transportation Services  Private: Car    Final Discharge Disposition Code: 01 - home or self-care

## 2025-02-13 NOTE — DISCHARGE INSTRUCTIONS
-Continue both antibiotics as prescribed until finished  -Apply topical antibiotics as prescribed 3 times a day  -Keep wound covered and clean as directed.  -Follow-up with your primary care provider in 2 to 3 days for recheck and continued care as directed.

## 2025-02-14 NOTE — OUTREACH NOTE
Prep Survey      Flowsheet Row Responses   [facility] facility patient discharged from? [facility]   Is LACE score < 7 ? No   Eligibility Readm Mgmt   Discharge diagnosis AbscessRight upper extremity cellulitis   Does the patient have one of the following disease processes/diagnoses(primary or secondary)? Other   Does the patient have Home health ordered? No   Is there a DME ordered? No   Prep survey completed? Yes            KATHRIN SHEPARD - Registered Nurse
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
## 2025-02-15 LAB
BACTERIA SPEC AEROBE CULT: NORMAL
BACTERIA SPEC AEROBE CULT: NORMAL

## 2025-02-20 ENCOUNTER — READMISSION MANAGEMENT (OUTPATIENT)
Dept: CALL CENTER | Facility: HOSPITAL | Age: 40
End: 2025-02-20
Payer: COMMERCIAL

## 2025-02-20 NOTE — OUTREACH NOTE
Medical Week 1 Survey      Flowsheet Row Responses   [facility] facility patient discharged from? [facility]   Does the patient have one of the following disease processes/diagnoses(primary or secondary)? Other   Week 1 attempt successful? No   Unsuccessful attempts Attempt 1            Toyin Quintanilla Registered Nurse

## 2025-02-27 ENCOUNTER — READMISSION MANAGEMENT (OUTPATIENT)
Dept: CALL CENTER | Facility: HOSPITAL | Age: 40
End: 2025-02-27
Payer: COMMERCIAL

## 2025-02-27 NOTE — OUTREACH NOTE
Medical Week 2 Survey      Flowsheet Row Responses   [facility] patient discharged from? [facility]   Does the patient have one of the following disease processes/diagnoses(primary or secondary)? Other   Week 2 attempt successful? No   Unsuccessful attempts Attempt 1            JOHN Quintanilla Registered Nurse

## 2025-03-05 ENCOUNTER — READMISSION MANAGEMENT (OUTPATIENT)
Dept: CALL CENTER | Facility: HOSPITAL | Age: 40
End: 2025-03-05
Payer: COMMERCIAL

## 2025-03-05 NOTE — OUTREACH NOTE
Medical Week 2 Survey      Flowsheet Row Responses   [facility] facility patient discharged from? [facility]   Does the patient have one of the following disease processes/diagnoses(primary or secondary)? Other   Week 2 attempt successful? No   Unsuccessful attempts Attempt 2            Emily BRISCOE - Registered Nurse

## 2025-03-06 ENCOUNTER — HOSPITAL ENCOUNTER (EMERGENCY)
Facility: HOSPITAL | Age: 40
Discharge: HOME | End: 2025-03-06
Attending: STUDENT IN AN ORGANIZED HEALTH CARE EDUCATION/TRAINING PROGRAM
Payer: COMMERCIAL

## 2025-03-06 VITALS
SYSTOLIC BLOOD PRESSURE: 152 MMHG | BODY MASS INDEX: 30.25 KG/M2 | DIASTOLIC BLOOD PRESSURE: 108 MMHG | HEART RATE: 98 BPM | WEIGHT: 216.05 LBS | HEIGHT: 71 IN | RESPIRATION RATE: 18 BRPM | TEMPERATURE: 97.9 F | OXYGEN SATURATION: 100 %

## 2025-03-06 DIAGNOSIS — S01.81XA FACIAL LACERATION, INITIAL ENCOUNTER: Primary | ICD-10-CM

## 2025-03-06 PROCEDURE — 99282 EMERGENCY DEPT VISIT SF MDM: CPT | Performed by: STUDENT IN AN ORGANIZED HEALTH CARE EDUCATION/TRAINING PROGRAM

## 2025-03-06 RX ORDER — CEPHALEXIN 500 MG/1
500 CAPSULE ORAL 2 TIMES DAILY
Qty: 10 CAPSULE | Refills: 0 | Status: SHIPPED | OUTPATIENT
Start: 2025-03-06 | End: 2025-03-11

## 2025-03-06 NOTE — ED PROVIDER NOTES
Pt Name: Triston Villanueva  MRN: [id number]  : 1985  Date of Encounter: 3/6/2025    PCP: Chela Bain APRN      Subjective    History of Present Illness:    Chief Complaint: Facial laceration    History of Present Illness: Triston Villanueva is a 39 y.o. male who presents to the ER complaining of facial that started 9 AM this morning.  Patient states he was working on his car when he slipped had a mechanical fall ground-level hit some concrete with his left cheekbone which caused a laceration..  Patient states he did wash his face but continued to work with a covered until he was finished for the day and then came to the emergency room for evaluation.  Patient denies any loss of consciousness nausea vomiting changes in vision or tenderness to face.  Triage Vitals:    ED Triage Vitals [25 1447]   Temp Heart Rate Resp BP SpO2   97.9 °F (36.6 °C) 98 18 (!) 152/108 100 %      Temp src Heart Rate Source Patient Position BP Location FiO2 (%)   Oral Monitor Sitting Left arm --       Nurses Notes reviewed and agree, including vitals, allergies, social history and prior medical history.     Patient has no known allergies.    Past Medical History:   Diagnosis Date    Hepatitis C     Hypertension        Past Surgical History:   Procedure Laterality Date    CHOLECYSTECTOMY WITH INTRAOPERATIVE CHOLANGIOGRAM N/A 6/3/2020    Procedure: CHOLECYSTECTOMY LAPAROSCOPIC INTRAOPERATIVE CHOLANGIOGRAPHY;  Surgeon: George Day MD;  Location: [facility];  Service: General;  Laterality: N/A;       Social History     Socioeconomic History    Marital status:    Tobacco Use    Smoking status: Every Day     Current packs/day: 1.00     Types: Cigarettes    Smokeless tobacco: Never   Vaping Use    Vaping status: Every Day    Substances: THC    Devices: Disposable   Substance and Sexual Activity    Alcohol use: Never    Drug use: Yes     Types: IV, Heroin, Methamphetamines     Comment: relapsed    Sexual activity: Defer        History reviewed. No pertinent family history.    REVIEW OF SYSTEMS:     All systems reviewed and not pertinent unless noted.    Review of Systems   Skin:  Positive for wound.   All other systems reviewed and are negative.      Objective    Physical Exam  Vitals and nursing note reviewed.   Constitutional:       Appearance: Normal appearance.   HENT:      Head: Normocephalic and atraumatic.        Comments: Approximately 3 cm laceration to the left cheek  Eyes:      Extraocular Movements: Extraocular movements intact.      Pupils: Pupils are equal, round, and reactive to light.   Cardiovascular:      Rate and Rhythm: Normal rate and regular rhythm.      Pulses: Normal pulses.      Heart sounds: Normal heart sounds.   Pulmonary:      Effort: Pulmonary effort is normal.      Breath sounds: Normal breath sounds.   Abdominal:      General: Bowel sounds are normal.      Palpations: Abdomen is soft.   Musculoskeletal:         General: Normal range of motion.      Cervical back: Normal range of motion and neck supple.   Skin:     General: Skin is warm and dry.      Capillary Refill: Capillary refill takes less than 2 seconds.   Neurological:      Mental Status: He is alert.      GCS: GCS eye subscore is 4. GCS verbal subscore is 5. GCS motor subscore is 6.      Sensory: Sensation is intact.      Motor: Motor function is intact.   Psychiatric:         Attention and Perception: Attention and perception normal.         Mood and Affect: Mood and affect normal.         Speech: Speech normal.                           Laceration Repair    Date/Time: 3/6/2025 4:00 PM    Performed by: Morgan Back APRN  Authorized by: Livan Gomez MD    Consent:     Consent obtained:  Verbal    Consent given by:  Patient    Risks, benefits, and alternatives were discussed: yes      Risks discussed:  Infection, pain, retained foreign body, need for additional repair, poor cosmetic result and poor wound healing    Alternatives  discussed:  No treatment, delayed treatment, observation and referral  Universal protocol:     Procedure explained and questions answered to patient or proxy's satisfaction: yes      Relevant documents present and verified: yes      Site/side marked: yes      Immediately prior to procedure, a time out was called: yes      Patient identity confirmed:  Verbally with patient  Anesthesia:     Anesthesia method:  None  Laceration details:     Location:  Face    Face location:  L cheek    Length (cm):  3  Exploration:     Contaminated: no    Treatment:     Area cleansed with:  Saline    Amount of cleaning:  Standard    Irrigation method:  Pressure wash  Skin repair:     Repair method:  Tissue adhesive  Approximation:     Approximation:  Close  Repair type:     Repair type:  Simple  Post-procedure details:     Dressing:  Open (no dressing)    Procedure completion:  Tolerated  Comments:      Patient was offered simple suturing but stated that he just would prefer that it was glued.      ED Course:    No orders to display            Orders placed during this visit:    Orders Placed This Encounter   Procedures    Laceration Repair       LAB Results:    Lab Results (last 24 hours)       ** No results found for the last 24 hours. **             If labs were ordered, I have independently reviewed the results and considered them in the diagnosis and treatment plan for the patient    RADIOLOGY    No radiology results from the last 24 hrs     If I have ordered, I have independently reviewed the above noted radiographic studies.  Please see the radiologist dictation for the official interpretation    Medications given to patient in the ER    Medications - No data to display    AS OF 16:01 EST VITALS:    BP - (!) 152/108  HR - 98  TEMP - 97.9 °F (36.6 °C) (Oral)  O2 SATS - 100%         Shared Decision Making: After my consideration of the clinical presentation and laboratory/radiology studies obtained, I have discussed the findings  with the patient/patient representative who is in agreement with the treatment plan and final disposition. Risks and benefits of discharge and/or observation admission were discussed.  Final disposition of the patient will be discharged.  Patient is requested to follow-up with primary care provider and specialist in 1 week following final discharge.      Medical Decision Making  Triston Villanueva is a 39 y.o. male who presents to the ER complaining of facial that started 9 AM this morning.  Patient states he was working on his car when he slipped had a mechanical fall ground-level hit some concrete with his left cheekbone which caused a laceration..  Patient states he did wash his face but continued to work with a covered until he was finished for the day and then came to the emergency room for evaluation.  Patient denies any loss of consciousness nausea vomiting changes in vision or tenderness to face.    DDX: includes but is not limited to: Facial laceration, facial contusion,    Problems Addressed:  Facial laceration, initial encounter: complicated acute illness or injury    Amount and/or Complexity of Data Reviewed  Discussion of management or test interpretation with external provider(s): Discussed assessment, treatment and plan with ER attending    Risk  Prescription drug management.  Risk Details: I have discussed with patient the finding of the test preformed today. Patient has been diagnosed with facial laceration and will be discharged home. Advised on return precautions the importance of close follow-up with primary care provider.  Strict return precautions have been given and patient verbalizes understanding        Final diagnoses:   Facial laceration, initial encounter       Please note that portions of this document were completed using voice recognition dictation software.       Morgan Back, APRN  03/06/25 1245

## 2025-03-06 NOTE — DISCHARGE INSTRUCTIONS
- Please take all medication as prescribed please read drug instructions and ask the pharmacist if you have any questions regarding your medications  -Please follow up with your Primary Care Physician within three days or sooner if symptoms worsen  -Please return to the nearest emergency department as needed or desired for any new, worsening, or persistent symptoms.

## 2025-04-28 ENCOUNTER — APPOINTMENT (OUTPATIENT)
Dept: CT IMAGING | Facility: HOSPITAL | Age: 40
End: 2025-04-28
Payer: COMMERCIAL

## 2025-04-28 ENCOUNTER — APPOINTMENT (OUTPATIENT)
Dept: GENERAL RADIOLOGY | Facility: HOSPITAL | Age: 40
End: 2025-04-28
Payer: COMMERCIAL

## 2025-04-28 ENCOUNTER — HOSPITAL ENCOUNTER (EMERGENCY)
Facility: HOSPITAL | Age: 40
Discharge: HOME | End: 2025-04-28
Attending: STUDENT IN AN ORGANIZED HEALTH CARE EDUCATION/TRAINING PROGRAM | Admitting: STUDENT IN AN ORGANIZED HEALTH CARE EDUCATION/TRAINING PROGRAM
Payer: COMMERCIAL

## 2025-04-28 VITALS
RESPIRATION RATE: 18 BRPM | WEIGHT: 216.05 LBS | OXYGEN SATURATION: 97 % | HEART RATE: 93 BPM | SYSTOLIC BLOOD PRESSURE: 144 MMHG | BODY MASS INDEX: 30.25 KG/M2 | DIASTOLIC BLOOD PRESSURE: 122 MMHG | HEIGHT: 71 IN | TEMPERATURE: 97.5 F

## 2025-04-28 DIAGNOSIS — T50.904A OVERDOSE OF UNDETERMINED INTENT, INITIAL ENCOUNTER: Primary | ICD-10-CM

## 2025-04-28 PROCEDURE — 93005 ELECTROCARDIOGRAM TRACING: CPT | Performed by: STUDENT IN AN ORGANIZED HEALTH CARE EDUCATION/TRAINING PROGRAM

## 2025-04-28 PROCEDURE — 99283 EMERGENCY DEPT VISIT LOW MDM: CPT | Performed by: STUDENT IN AN ORGANIZED HEALTH CARE EDUCATION/TRAINING PROGRAM

## 2025-04-28 NOTE — ED NOTES
Pt sister at bedside att. Pt is a&o x4 att and is requesting to be discharged. Pt vitals table for DC

## 2025-04-28 NOTE — ED NOTES
This RN and 3 other RN's attempted IV access on pt and were unsuccessful DT pt being combative throughout process. Charge RNDavid at bedside att and notified. Provider notified.

## 2025-04-28 NOTE — ED PROVIDER NOTES
Subjective  History of Present Illness:    Patient is a 39-year-old male, history of hepatitis C, hypertension presented for evaluation of drug overdose.  Patient was doing intake at [facility] for inpatient substance abuse, and while there, was noted to have a suspected overdose as he was talking to the coordinators when the patient went unconscious, they administered 12 mg of Narcan intranasally with some positive response initially, patient was able to run from the police per EMS report but then has been acting altered since then.  He admitted to using heroin earlier this morning per report.  At time of arrival, patient is protecting his own airway, his pupils appear normal, he will not answer verbally but patient does respond to touch.  Head appears atraumatic.      Nurses Notes reviewed and agree, including vitals, allergies, social history and prior medical history.     REVIEW OF SYSTEMS: All systems reviewed and not pertinent unless noted.  Review of Systems   Reason unable to perform ROS: Not verbally responsive.   All other systems reviewed and are negative.      Past Medical History:   Diagnosis Date    Hepatitis C     Hypertension        Allergies:    Patient has no known allergies.      Past Surgical History:   Procedure Laterality Date    CHOLECYSTECTOMY WITH INTRAOPERATIVE CHOLANGIOGRAM N/A 6/3/2020    Procedure: CHOLECYSTECTOMY LAPAROSCOPIC INTRAOPERATIVE CHOLANGIOGRAPHY;  Surgeon: George Day MD;  Location: [facility];  Service: General;  Laterality: N/A;         Social History     Socioeconomic History    Marital status:    Tobacco Use    Smoking status: Every Day     Current packs/day: 1.00     Types: Cigarettes    Smokeless tobacco: Never   Vaping Use    Vaping status: Every Day    Substances: THC    Devices: Disposable   Substance and Sexual Activity    Alcohol use: Never    Drug use: Yes     Types: IV, Heroin, Methamphetamines     Comment: relapsed    Sexual activity: Defer  "        History reviewed. No pertinent family history.    Objective  Physical Exam:  BP (!) 144/122   Pulse 93   Temp 97.5 °F (36.4 °C) (Axillary)   Resp 18   Ht 180.3 cm (71\")   Wt 98 kg (216 lb 0.8 oz)   SpO2 97%   BMI 30.13 kg/m²      Physical Exam  Vitals and nursing note reviewed.   Constitutional:       Appearance: He is not ill-appearing, toxic-appearing or diaphoretic.   HENT:      Head: Normocephalic and atraumatic.      Nose: Nose normal.      Mouth/Throat:      Mouth: Mucous membranes are moist.      Pharynx: Oropharynx is clear.   Eyes:      Extraocular Movements: Extraocular movements intact.      Conjunctiva/sclera: Conjunctivae normal.      Pupils: Pupils are equal, round, and reactive to light.   Cardiovascular:      Rate and Rhythm: Normal rate and regular rhythm.      Pulses: Normal pulses.      Heart sounds: Normal heart sounds.   Pulmonary:      Effort: Pulmonary effort is normal.      Breath sounds: Normal breath sounds.   Abdominal:      General: Abdomen is flat. There is no distension.      Tenderness: There is no abdominal tenderness. There is no guarding or rebound.   Musculoskeletal:         General: No swelling or tenderness. Normal range of motion.      Cervical back: Normal range of motion.   Skin:     General: Skin is warm and dry.      Capillary Refill: Capillary refill takes less than 2 seconds.   Neurological:      Mental Status: He is alert. He is disoriented.      Comments: Unable to assess due to mental status   Psychiatric:      Comments: Unable to assess due to mental status               Procedures    ED Course:    ED Course as of 04/28/25 1556   Mon Apr 28, 2025   1235 EKG interpreted by me, normal sinus rhythm with no concerning ST changes noted, rate of 90 [JE]   1335 Patient was reportedly able to tell with a nursing staff that he gets IVs in his foot, however we will then go back to being responsive to painful stimuli.  Nursing staff attempted multiple times to " obtain IV access but unsuccessful due to patient being combative. [JR]   1410 Patient was able to stand up and urinate approximately 30 minutes ago from this time. [JR]   1436 Patient declining any further labs or workup.  Would like to sleep [JR]      ED Course User Index  [JE] Jon Gutierrez MD  [JR] Radu Jules PA-C       Lab Results (last 24 hours)       ** No results found for the last 24 hours. **             No radiology results from the last 24 hrs       MDM      Initial impression of presenting illness: Patient is a 39-year-old male presented for evaluation of suspected drug overdose    DDX: includes but is not limited to: Overdose, intracranial abnormality, lecture light imbalance, withdrawal, among others    Patient arrives hemodynamically stable with vitals interpreted by myself.     Pertinent features from physical exam: Patient is currently not verbally responsive, pupils are equal and normal bilaterally, not pinpoint.  Head appears atraumatic, lungs grossly clear throughout, cardiac auscultation regular rate and rhythm, patient has noted move extremities without difficulty, responds to painful stimuli..    Initial diagnostic plan: CBC CMP COVID flu lipase urinalysis troponin, CRP, procalcitonin, EKG, CT head and cervical spine without contrast, chest x-ray    Results from initial plan were reviewed and interpreted by me revealing EKG normal sinus rhythm no concerning ST changes rate of 90,, patient declined any type of further workup.    Diagnostic information from other sources: Record reviewed    Interventions / Re-evaluation: Medications - No data to display    Results/clinical rationale were discussed with patient at bedside.  Patient was protecting his own airway throughout his stay here, remained nonhypoxic on room air.  Given this, no indication for repeat Narcan.  Patient was observed for several hours here in the emergency department, he had periods of response to painful stimuli  but also periods of being awake alert enough to urinate on his own and go back to sleep.  Nursing staff was unable to obtain lab work due to patient being combative when trying to obtain lab work.  Patient was reassessed after several hours and is now completely awake alert and oriented, able to stand and walk to the bathroom on his own and remain alert, and he declines any further workup, he does not want to seek any inpatient treatment and wants to be discharged at this time.    Consultations/Discussion of results with other physicians: Discussed with ED attending physician    Disposition plan: Discharge.  Patient was provided outpatient resources for substance use.  He is completely alert and oriented x 4 at this time.  Able to walk without difficulty.  Sister was at bedside.  Patient adamantly declined any inpatient referrals and wishes to seek outpatient treatment.  -----    Final diagnoses:   Overdose of undetermined intent, initial encounter          Radu Jules PA-C  04/28/25 1552

## 2025-04-28 NOTE — DISCHARGE INSTRUCTIONS
Please follow-up with resources provided to you.  Return for worsening symptoms or should you wish to seek inpatient treatment

## 2025-06-06 ENCOUNTER — HOSPITAL ENCOUNTER (EMERGENCY)
Facility: HOSPITAL | Age: 40
Discharge: HOME | End: 2025-06-06
Attending: EMERGENCY MEDICINE
Payer: COMMERCIAL

## 2025-06-06 VITALS
HEART RATE: 99 BPM | OXYGEN SATURATION: 92 % | BODY MASS INDEX: 30.25 KG/M2 | HEIGHT: 71 IN | TEMPERATURE: 98.1 F | SYSTOLIC BLOOD PRESSURE: 147 MMHG | RESPIRATION RATE: 20 BRPM | DIASTOLIC BLOOD PRESSURE: 95 MMHG | WEIGHT: 216.05 LBS

## 2025-06-06 DIAGNOSIS — Z00.8 MEDICAL CLEARANCE FOR INCARCERATION: Primary | ICD-10-CM

## 2025-06-06 DIAGNOSIS — T50.901A ACCIDENTAL OVERDOSE, INITIAL ENCOUNTER: ICD-10-CM

## 2025-06-06 PROCEDURE — 99282 EMERGENCY DEPT VISIT SF MDM: CPT | Performed by: EMERGENCY MEDICINE

## 2025-06-06 NOTE — ED PROVIDER NOTES
EMERGENCY DEPARTMENT ENCOUNTER    Pt Name: Triston Villanueva  MRN: [id number]  Pt :   1985  Room Number:  19SF/19  Date of encounter:  2025  PCP: Chela Bain APRN  ED Provider: Gilberto Montez MD    Historian: EMS      HPI:  Chief Complaint   Patient presents with    Drug Overdose          Context: Triston Villanueva is a 39 y.o. male who presents to the ED c/o drug overdose, patient was seen walking on the street, when police approached him, he went unresponsive, with concern for possible overdose, the patient was given Narcan, on arrival the patient is sneezing, and minimally responsive but with no respiratory involvement      PAST MEDICAL HISTORY  Past Medical History:   Diagnosis Date    Hepatitis C     Hypertension          PAST SURGICAL HISTORY  Past Surgical History:   Procedure Laterality Date    CHOLECYSTECTOMY WITH INTRAOPERATIVE CHOLANGIOGRAM N/A 6/3/2020    Procedure: CHOLECYSTECTOMY LAPAROSCOPIC INTRAOPERATIVE CHOLANGIOGRAPHY;  Surgeon: George Day MD;  Location: [facility];  Service: General;  Laterality: N/A;         FAMILY HISTORY  History reviewed. No pertinent family history.      SOCIAL HISTORY  Social History     Socioeconomic History    Marital status:    Tobacco Use    Smoking status: Every Day     Current packs/day: 1.00     Types: Cigarettes    Smokeless tobacco: Never   Vaping Use    Vaping status: Every Day    Substances: THC    Devices: Disposable   Substance and Sexual Activity    Alcohol use: Never    Drug use: Yes     Types: IV, Heroin, Methamphetamines     Comment: relapsed    Sexual activity: Defer         ALLERGIES  Patient has no known allergies.        REVIEW OF SYSTEMS  Review of Systems   Unable to perform ROS: Mental status change        All systems reviewed and negative except for those discussed in HPI.       PHYSICAL EXAM    I have reviewed the triage vital signs and nursing notes.    ED Triage Vitals [25 1324]   Temp Heart Rate Resp BP  SpO2   98.1 °F (36.7 °C) 102 19 136/88 100 %      Temp src Heart Rate Source Patient Position BP Location FiO2 (%)   Axillary Monitor Sitting -- --       Physical Exam  Constitutional:       Appearance: He is not ill-appearing.      Comments: Altered middle-age male, no acute respiratory distress   HENT:      Head: Normocephalic and atraumatic.      Right Ear: External ear normal.      Left Ear: External ear normal.      Nose: Nose normal.      Mouth/Throat:      Mouth: Mucous membranes are moist.      Pharynx: Oropharynx is clear.   Eyes:      Extraocular Movements: Extraocular movements intact.      Conjunctiva/sclera: Conjunctivae normal.      Pupils: Pupils are equal, round, and reactive to light.   Cardiovascular:      Rate and Rhythm: Normal rate and regular rhythm.      Pulses:           Radial pulses are 2+ on the right side and 2+ on the left side.      Heart sounds: No murmur heard.  Pulmonary:      Effort: Pulmonary effort is normal.      Breath sounds: Normal breath sounds.   Abdominal:      General: There is no distension.      Tenderness: There is no abdominal tenderness. There is no guarding.   Musculoskeletal:         General: No swelling or deformity.      Cervical back: Normal range of motion and neck supple.   Skin:     General: Skin is warm and dry.      Capillary Refill: Capillary refill takes less than 2 seconds.      Findings: No rash.   Neurological:      General: No focal deficit present.      Mental Status: He is oriented to person, place, and time.            LAB RESULTS  No results found for this or any previous visit (from the past 24 hours).    If labs were ordered, I independently reviewed the results and considered them in treating the patient.        RADIOLOGY  No Radiology Exams Resulted Within Past 24 Hours        PROCEDURES    Procedures    Interpretations    O2 Sat: The patient's oxygen saturation was 92% on Room Air.  This was independently interpreted by me as  Normal      MEDICATIONS GIVEN IN ER    Medications - No data to display      MEDICAL DECISION MAKING, PROGRESS, and CONSULTS    All labs, if obtained, have been independently reviewed by me.  All radiology studies, if obtained, have been reviewed by me and the radiologist dictating the report.  All EKG's, if obtained, have been independently viewed and interpreted by me      Discussion below represents my analysis of pertinent findings related to patient's condition, differential diagnosis, treatment plan and final disposition.      Differential diagnosis:    39-year-old male presenting to the ED with complaint of possible drug overdose, police had approached him at which point he became unresponsive with concern for possible drug overdose, given Narcan, he is stable from a respiratory status, I think there is probably some malingering going on given that he is in custody currently, will not give any additional Narcan, will observe the patient for recurrent respiratory issues, and likely discharge to police custody    Additional Sources:  Discussed/ obtained information from independent historians:  Police at bedside      Orders placed during this visit:  No orders of the defined types were placed in this encounter.        Additional orders considered but not ordered:  None    ED Course:    Consultants:  None    ED Course as of 06/06/25 1453   Fri Jun 06, 2025   1430 Patient awoke briskly with sternal rub, no respiratory compromise, will be discharged into police custody [CS]      ED Course User Index  [CS] Gilberto Montez MD           After my consideration of clinical presentation and any laboratory/radiology studies obtained, I discussed the findings with the patient/patient representative who is in agreement with the treatment plan and the final disposition. Risks and benefits of discharge were discussed.     AS OF 14:53 EDT VITALS:    BP - 147/95  HR - 99  TEMP - 98.1 °F (36.7 °C) (Axillary)  O2 SATS  - 92%    I reviewed the patient's prescription monitoring report if available prior to discharge    DIAGNOSIS  Final diagnoses:   Medical clearance for incarceration   Accidental overdose, initial encounter         DISPOSITION  ED Disposition       ED Disposition   Discharge    Condition   Stable    Comment   --                   Please note that portions of this document were completed with voice recognition software.        Gilberto Montez MD  06/06/25 4922

## (undated) DEVICE — CLAVICLE STRAP: Brand: DEROYAL

## (undated) DEVICE — DISPOSABLE MONOPOLAR ENDOSCOPIC CORD 10 FT. (3M): Brand: KIRWAN

## (undated) DEVICE — MONOPOLAR METZENBAUM SCISSOR, MINI BLADE TIP, DISPOSABLE: Brand: MONOPOLAR METZENBAUM SCISSOR, MINI BLADE TIP, DISPOSABLE

## (undated) DEVICE — GLV SURG SENSICARE W/ALOE PF LF 8.5 STRL

## (undated) DEVICE — RICH GENERAL LAPAROSCOPY: Brand: MEDLINE INDUSTRIES, INC.

## (undated) DEVICE — ENDOPATH XCEL BLADELESS TROCARS WITH STABILITY SLEEVES: Brand: ENDOPATH XCEL

## (undated) DEVICE — 2, DISPOSABLE SUCTION/IRRIGATOR WITHOUT DISPOSABLE TIP: Brand: STRYKEFLOW

## (undated) DEVICE — KT CATH CHOLANGIOGRA PERC W/BALLO

## (undated) DEVICE — ENDOPATH XCEL UNIVERSAL TROCAR STABLILITY SLEEVES: Brand: ENDOPATH XCEL

## (undated) DEVICE — CUFF SCD HEMOFORCE SEQ CALF STD MD

## (undated) DEVICE — BLD CLIP UNIV SURG GRY

## (undated) DEVICE — UNDYED BRAIDED (POLYGLACTIN 910), SYNTHETIC ABSORBABLE SUTURE: Brand: COATED VICRYL

## (undated) DEVICE — TP ELECTRD LAP L WR SPLIT33CM